# Patient Record
Sex: FEMALE | Race: WHITE | Employment: OTHER | URBAN - METROPOLITAN AREA
[De-identification: names, ages, dates, MRNs, and addresses within clinical notes are randomized per-mention and may not be internally consistent; named-entity substitution may affect disease eponyms.]

---

## 2020-01-06 ENCOUNTER — HOSPITAL ENCOUNTER (EMERGENCY)
Age: 55
Discharge: HOME | End: 2020-01-06
Attending: EMERGENCY MEDICINE
Payer: COMMERCIAL

## 2020-01-06 VITALS
RESPIRATION RATE: 15 BRPM | WEIGHT: 250 LBS | SYSTOLIC BLOOD PRESSURE: 180 MMHG | BODY MASS INDEX: 40.18 KG/M2 | TEMPERATURE: 98.3 F | HEIGHT: 66 IN | DIASTOLIC BLOOD PRESSURE: 99 MMHG | OXYGEN SATURATION: 97 % | HEART RATE: 89 BPM

## 2020-01-06 PROCEDURE — 99283 EMERGENCY DEPT VISIT LOW MDM: CPT

## 2020-01-06 ASSESSMENT — PAIN DESCRIPTION - PROGRESSION: CLINICAL_PROGRESSION: NOT CHANGED

## 2020-01-06 ASSESSMENT — PAIN DESCRIPTION - DESCRIPTORS: DESCRIPTORS: ACHING;HEADACHE

## 2020-01-06 ASSESSMENT — PAIN DESCRIPTION - ONSET: ONSET: ON-GOING

## 2020-01-06 ASSESSMENT — PAIN DESCRIPTION - PAIN TYPE: TYPE: ACUTE PAIN

## 2020-01-06 ASSESSMENT — PAIN DESCRIPTION - FREQUENCY: FREQUENCY: CONTINUOUS

## 2020-01-06 ASSESSMENT — PAIN DESCRIPTION - LOCATION: LOCATION: HEAD

## 2020-01-06 ASSESSMENT — PAIN DESCRIPTION - ORIENTATION: ORIENTATION: RIGHT

## 2020-01-06 ASSESSMENT — PAIN SCALES - GENERAL: PAINLEVEL_OUTOF10: 4

## 2020-01-06 NOTE — ED TRIAGE NOTES
Just recently started on B/P meds. Has had some spiking of her B/P. This morning twice it has been high.

## 2020-01-29 NOTE — ED PROVIDER NOTES
Triage Chief Complaint:   Hypertension and Headache    [place]:  Jorge Tipton is a 47 y.o. female that presents for hypertension. Patient states that she has been on spironolactone for about 2 weeks and has been checking her blood pressure last few days but it has been continuously high at home including up into the 180s. States that since then she started taking the medication she will have some headaches and feel drowsy for the first few hours but then feels better later in the day. She is worried because her blood pressure is still high. She does not have an appointment with her PCP tomorrow. Denies any chest pain, shortness of breath, abdominal pain, nausea, vomiting, visual changes. ROS:  At least 14 systems reviewed and otherwise acutely negative except as in the [address] Past Medical History:   Diagnosis Date    Hypertension      Past Surgical History:   Procedure Laterality Date    TONSILLECTOMY      URETHRAL STRICTURE DILATATION      WISDOM TOOTH EXTRACTION       History reviewed. No pertinent family history. Social History     Socioeconomic History    Marital status:      Spouse name: Not on file    Number of children: Not on file    Years of education: Not on file    Highest education level: Not on file   Occupational History    Not on file   Social Needs    Financial resource strain: Not on file    Food insecurity:     Worry: Not on file     Inability: Not on file    Transportation needs:     Medical: Not on file     Non-medical: Not on file   Tobacco Use    Smoking status: Never Smoker   Substance and Sexual Activity    Alcohol use:  Yes    Drug use: No    Sexual activity: Not on file   Lifestyle    Physical activity:     Days per week: Not on file     Minutes per session: Not on file    Stress: Not on file   Relationships    Social connections:     Talks on phone: Not on file     Gets together: Not on file     Attends Alevism service: Not on file     Active member of club or organization: Not on file     Attends meetings of clubs or organizations: Not on file     Relationship status: Not on file    Intimate partner violence:     Fear of current or ex partner: Not on file     Emotionally abused: Not on file     Physically abused: Not on file     Forced sexual activity: Not on file   Other Topics Concern    Not on file   Social History Narrative    Not on file     No current facility-administered medications for this encounter. Current Outpatient Medications   Medication Sig Dispense Refill    acetaminophen (AMINOFEN) 325 MG tablet Take 1 tablet by mouth every 4 hours as needed for Pain. 120 tablet 3     Allergies   Allergen Reactions    Aspirin     Penicillins     Bactrim [Sulfamethoxazole-Trimethoprim] Nausea And Vomiting       Nursing Notes Reviewed    Physical Exam:  ED Triage Vitals [01/06/20 0508]   Enc Vitals Group      BP (!) 163/104      Pulse 100      Resp 18      Temp 98.3 °F (36.8 °C)      Temp Source Oral      SpO2 97 %      Weight 250 lb (113.4 kg)      Height 5' 6\" (1.676 m)      Head Circumference       Peak Flow       Pain Score       Pain Loc       Pain Edu? Excl. in [address]? GENERAL APPEARANCE: Awake and alert. Cooperative. No acute distress. HEAD: Normocephalic. Atraumatic. EYES: EOM's grossly intact. Sclera anicteric. ENT: Mucous membranes are moist. Tolerates saliva. No trismus. NECK: Supple. Trachea midline. HEART: RRR. Radial pulses 2+. LUNGS: Respirations unlabored. CTAB  ABDOMEN: Soft. Non-tender. No guarding or rebound. EXTREMITIES: No acute deformities. SKIN: Warm and dry. NEUROLOGICAL: No gross facial drooping. Moves all 4 extremities spontaneously. PSYCHIATRIC: Normal mood. I have reviewed and interpreted all of the currently available lab results from this visit (if applicable):  No results found for this visit on 01/06/20.    Radiographs (if obtained):  [] The following radiograph was interpreted by myself in the absence of a radiologist:  [] Radiologist's Report Reviewed:    EKG (if obtained): (All EKG's are interpreted by myself in the absence of a cardiologist)    MDM:  Plan of care is discussed thoroughly with the patient and family if present. If performed, all imaging and lab work also discussed with patient. All relevant prior results and chart reviewed if available. Patient presents with asymptomatic hypertension. I believe that she does have some adverse effects from newly initiated spironolactone including drowsiness and headaches. There is nothing about her headaches concerning for acute intracranial emergent process that requires imaging at this time. The patient has no other complaints at this time consistent with an acute life-threatening emergency. Patient does not have any chest pain, shortness of breath, abdominal pain, hematuria, neurologic deficits. No indication for acute antihypertensive therapy as risks greatly outweigh benefits. I did encourage the patient to follow up closely with primary care physician in keep a daily log of blood pressures at home so that outpatient medications can be adjusted/initiated as needed by primary care physician. Patient verbalized understanding and agrees to return if any symptoms such as those listed above occurred. Clinical Impression:  1.  Hypertension, unspecified type      (Please note that portions of this note may have been completed with a voice recognition program. Efforts were made to edit the dictations but occasionally words are mis-transcribed.)    MD Monika Harper MD  01/06/20 3652 cont Simvastatin

## 2020-07-14 ENCOUNTER — TELEPHONE (OUTPATIENT)
Dept: BARIATRICS/WEIGHT MGMT | Age: 55
End: 2020-07-14

## 2020-08-12 ENCOUNTER — OFFICE VISIT (OUTPATIENT)
Dept: BARIATRICS/WEIGHT MGMT | Age: 55
End: 2020-08-12
Payer: COMMERCIAL

## 2020-08-12 VITALS
DIASTOLIC BLOOD PRESSURE: 86 MMHG | SYSTOLIC BLOOD PRESSURE: 122 MMHG | TEMPERATURE: 98.8 F | HEIGHT: 66 IN | WEIGHT: 271 LBS | HEART RATE: 107 BPM | OXYGEN SATURATION: 95 % | BODY MASS INDEX: 43.55 KG/M2

## 2020-08-12 PROBLEM — E66.01 MORBID OBESITY WITH BMI OF 40.0-44.9, ADULT (HCC): Status: ACTIVE | Noted: 2020-08-12

## 2020-08-12 PROCEDURE — 99204 OFFICE O/P NEW MOD 45 MIN: CPT | Performed by: SURGERY

## 2020-08-12 RX ORDER — AMLODIPINE BESYLATE 2.5 MG/1
TABLET ORAL
COMMUNITY
Start: 2020-08-03

## 2020-08-12 RX ORDER — MONTELUKAST SODIUM 10 MG/1
TABLET ORAL
COMMUNITY
Start: 2020-08-03

## 2020-08-12 SDOH — HEALTH STABILITY: MENTAL HEALTH: HOW MANY STANDARD DRINKS CONTAINING ALCOHOL DO YOU HAVE ON A TYPICAL DAY?: 1 OR 2

## 2020-08-12 ASSESSMENT — ENCOUNTER SYMPTOMS
ABDOMINAL DISTENTION: 1
SHORTNESS OF BREATH: 1
NAUSEA: 0
BLOOD IN STOOL: 0
VOICE CHANGE: 0
CONSTIPATION: 0
PHOTOPHOBIA: 0
COLOR CHANGE: 0
ABDOMINAL PAIN: 1
ANAL BLEEDING: 0
VOMITING: 0
TROUBLE SWALLOWING: 0
DIARRHEA: 0
COUGH: 0
WHEEZING: 0
BACK PAIN: 1
SORE THROAT: 0

## 2020-08-12 NOTE — PROGRESS NOTES
Bariatric Surgery Consultation    Kavya Sunshine, 1965, 47 y.o.,  female, CSN:   08/12/20     Chief Complaint:    Chief Complaint   Patient presents with    Bariatric, Initial Visit     New Bariatric Surgical Consult       SUBJECTIVE:  Dolly Dash is a 47 y.o. female being seen for morbid obesity, considering weight loss surgery; Afsaneh's, Height: 5' 6\" (167.6 cm), Weight: 271 lb (122.9 kg), Current Body mass index is 43.74 kg/m². The patient's PCP is the referring physician Aditya Frankel MD    HPI:  Dolly Dash has suffered from overweight / severe obesity for (10) years, and was of gradual onset but progressive course - tried MANY different diets and on and off over the weeks, months and years depression some difficult time with mom's dementia. . 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 and has 3 children. The patient first recognized that she had a weight problem about 10 years ago. The patient's lowest weight in the last five years was 240 lbs, and the highest weight in the last five years was 271.0 lbs. Weight Loss Program History:  The patient states she has tried Self dieting, Adipex, unsure of other medication. The most weight lost ever with diet and exercise was 10 Lbs, but unfortunately only kept them of for 3months. These attempts have been temporarily successful with weight loss, but have failed to sustain adequate weight loss. Mortality from the morbid obesity is very high:       Arianes life is significantly affected by weight related to her co-morbidities. The patient has also tried but failed self directed diet and exercise. Comorbid Conditions:  Significant diseases affecting this patient are   Past Medical History:   Diagnosis Date    Hypertension      And     Review of Systems - Review of Systems   Constitutional: Positive for fatigue. Negative for activity change, chills, diaphoresis and fever.    HENT: Negative for sore throat, trouble swallowing and voice change. Eyes: Negative for photophobia and visual disturbance. Respiratory: Positive for shortness of breath. Negative for cough and wheezing. Cardiovascular: Positive for leg swelling (More so my left than right. Marian Simpler ). Negative for chest pain and palpitations. Gastrointestinal: Positive for abdominal distention and abdominal pain (HEART BURN a lot, on prevacid and pepcid. Marian Simpler ). Negative for anal bleeding, blood in stool, constipation, diarrhea, nausea and vomiting. Endocrine: Positive for polyphagia. Negative for cold intolerance, heat intolerance, polydipsia and polyuria. Genitourinary: Positive for urgency. Negative for dysuria, frequency and hematuria. Musculoskeletal: Positive for arthralgias, back pain (LOWER back aches. Marian Simpler ) and gait problem. Negative for joint swelling, myalgias and neck stiffness. Skin: Negative for color change and rash. Neurological: Negative for seizures, speech difficulty, light-headedness and numbness. Hematological: Negative for adenopathy. Does not bruise/bleed easily. Psychiatric/Behavioral: Negative for sleep disturbance. The patient is nervous/anxious. All others reviewed and are negative. Allergies: Allergies   Allergen Reactions    Aspirin     Penicillins     Bactrim [Sulfamethoxazole-Trimethoprim] Nausea And Vomiting       Medications:  Current Outpatient Medications   Medication Sig Dispense Refill    amLODIPine (NORVASC) 2.5 MG tablet TAKE 1 TABLET BY MOUTH ONE TIME A DAY      montelukast (SINGULAIR) 10 MG tablet TAKE 1 TABLET BY MOUTH AT BEDTIME      acetaminophen (AMINOFEN) 325 MG tablet Take 1 tablet by mouth every 4 hours as needed for Pain. 120 tablet 3     No current facility-administered medications for this visit.         Past Surgical History:  Past Surgical History:   Procedure Laterality Date    ENDOMETRIAL ABLATION N/A 07/2020    TONSILLECTOMY      URETHRAL STRICTURE DILATATION  WISDOM TOOTH EXTRACTION         Family History:  Family History   Problem Relation Age of Onset   Tesfaye Acosta Cancer Mother     Dementia Mother     Hypertension Father     Diabetes Half-Brother        Social History:  Social History     Socioeconomic History    Marital status:      Spouse name: Not on file    Number of children: Not on file    Years of education: Not on file    Highest education level: Not on file   Occupational History    Not on file   Social Needs    Financial resource strain: Not on file    Food insecurity     Worry: Not on file     Inability: Not on file    Transportation needs     Medical: Not on file     Non-medical: Not on file   Tobacco Use    Smoking status: Never Smoker    Smokeless tobacco: Never Used   Substance and Sexual Activity    Alcohol use:  Yes     Alcohol/week: 7.0 - 14.0 standard drinks     Types: 7 - 14 Cans of beer per week     Drinks per session: 1 or 2    Drug use: No    Sexual activity: Not on file   Lifestyle    Physical activity     Days per week: Not on file     Minutes per session: Not on file    Stress: Not on file   Relationships    Social connections     Talks on phone: Not on file     Gets together: Not on file     Attends Mormon service: Not on file     Active member of club or organization: Not on file     Attends meetings of clubs or organizations: Not on file     Relationship status: Not on file    Intimate partner violence     Fear of current or ex partner: Not on file     Emotionally abused: Not on file     Physically abused: Not on file     Forced sexual activity: Not on file   Other Topics Concern    Not on file   Social History Narrative    Not on file         OBJECTIVE:     Physical Exam   /86 (Site: Left Upper Arm, Position: Sitting, Cuff Size: Large Adult)   Pulse 107   Temp 98.8 °F (37.1 °C) (Infrared)   Ht 5' 6\" (1.676 m)   Wt 271 lb (122.9 kg)   SpO2 95%   BMI 43.74 kg/m²    Constitutional:  Vital signs are normal. The patient appears well-developed and well-nourished. Head: Normocephalic. Neck: No mass and no thyromegaly present. Cardiovascular: Normal rate, regular rhythm, S1 normal and S2 normal.  No murmurs. Edema   Pulmonary/Chest: Effort normal and breath sounds normal.   Abdominal: Soft. Normal appearance. There is no splenomegaly, but fatty liver. No tenderness. There is no rigidity, no rebound, no guarding and no Craig's sign. Musculoskeletal:        Right lower leg: Normal. No tenderness and no edema. Left lower leg: Normal. No tenderness and no edema. Lymphadenopathy:     No cervical adenopathy. No axillary adenopathy. Skin: Skin is warm, dry and intact. No rash. Psychiatric: The patient is alert and oriented. The patient has a normal mood and affect. Speech is normal and behavior is normal. Judgment and thought content normal. Cognition and memory are normal.     ASSESSMENT & PLAN:    Patient Active Problem List   Diagnosis    Cellulitis    Morbid obesity with BMI of 40.0-44.9, adult ([facility] [address]       Counseled in length and will proceed. The patient is good candidate for surgery. The patient is interested in the RoboticSleeve Gastrectomy. Will continue work up toward surgery. Counseled extensively regardin) DIET and MONITOR the Weight:  - 1500 Kcal Diet/day. - Write down everything you eat and drink for 1 wk  - No calories from drinks  - Slim fast diet: 4 cans per day 1-2 days a week with only NO caloriesdrinks those days    2) EXERCISE: 1 hr/day 5 days a week    3) DIETITIAN / NUTRITIONAL CONSULT, evaluation and counseling to [place] healthy diet ~1500 Kcal /day    4) EXERCISE PHYSIOLOGIST CONSULT    5)PSYCHOLOGICAL EVALUATION    6) MONTHLY FOLLOW UP,  to follow and document diet and exercise trial    7) Planning a Sleeve Gastrectomy in few months    8) 2 Pictures were taken today to concretely monitorweight loss over time.     9) CARDIOLOGY OPTIMIZATION AND CLEARANCE BEFORE SURGERY    10) PULMONOLOGY OPTIMIZATION AND CLEARANCE BEFORE SURGERY    WILL CHECK BASELINE BARIATRIC COMPREHENSIVE LABS. Orders Placed This Encounter   Procedures    Basic Metabolic Panel    CBC Auto Differential    Hemoglobin A1C    Hepatic Function Panel    Lipid Panel    TSH without Reflex    Amb Referral to Nutrition Services    Ambulatory referral to Physical Therapy        Pt was handed a food diary notebook to help monitor Sancho intake, and patientinformation pamphlet on Sleeve Gastrectomy. I counseled the patient regarding weight loss, appropriate diet and exercise, and healthy eating habits.    - Eat slow, and chew 50-60 times before swallowing  - Eat smaller portions in smaller plates  - Weigh yourself at least 2-3 times a week    The patient will be scheduled for a follow up visit in Return in about 4 weeks (around 9/9/2020) for Bariatric follow up: diet, exercise & weight loss, For imaging and tests results review. .    Bariatric 1200 calorie diet, AFSHAN, heart healthy, 60-80 gram protein.    ____________________________________________    Lisa Garsia MD, FACS, FICS  Member of the American Society of Metabolic and Bariatric Surgeons    8/12/2020  3:57 PM

## 2020-08-17 ENCOUNTER — HOSPITAL ENCOUNTER (OUTPATIENT)
Age: 55
Discharge: HOME | End: 2020-08-17
Payer: COMMERCIAL

## 2020-08-17 LAB
ALBUMIN SERPL-MCNC: 4.7 GM/DL (ref 3.4–5)
ALP BLD-CCNC: 103 IU/L (ref 40–129)
ALT SERPL-CCNC: 29 U/L (ref 10–40)
ANION GAP SERPL CALCULATED.3IONS-SCNC: 10 MMOL/L (ref 4–16)
AST SERPL-CCNC: 29 IU/L (ref 15–37)
BASOPHILS ABSOLUTE: 0 K/CU MM
BASOPHILS RELATIVE PERCENT: 0.5 % (ref 0–1)
BILIRUB SERPL-MCNC: 0.7 MG/DL (ref 0–1)
BILIRUBIN DIRECT: 0.2 MG/DL (ref 0–0.3)
BILIRUBIN, INDIRECT: 0.5 MG/DL (ref 0–0.7)
BUN BLDV-MCNC: 12 MG/DL (ref 6–23)
CALCIUM SERPL-MCNC: 10 MG/DL (ref 8.3–10.6)
CHLORIDE BLD-SCNC: 101 MMOL/L (ref 99–110)
CHOLESTEROL: 229 MG/DL
CO2: 27 MMOL/L (ref 21–32)
CREAT SERPL-MCNC: 0.7 MG/DL (ref 0.6–1.1)
DIFFERENTIAL TYPE: ABNORMAL
EOSINOPHILS ABSOLUTE: 0.1 K/CU MM
EOSINOPHILS RELATIVE PERCENT: 1.1 % (ref 0–3)
ESTIMATED AVERAGE GLUCOSE: 117 MG/DL
GFR AFRICAN AMERICAN: >60 ML/MIN/1.73M2
GFR NON-AFRICAN AMERICAN: >60 ML/MIN/1.73M2
GLUCOSE BLD-MCNC: 87 MG/DL (ref 70–99)
HBA1C MFR BLD: 5.7 % (ref 4.2–6.3)
HCT VFR BLD CALC: 41.8 % (ref 37–47)
HDLC SERPL-MCNC: 68 MG/DL
HEMOGLOBIN: 13.7 GM/DL (ref 12.5–16)
IMMATURE NEUTROPHIL %: 0.3 % (ref 0–0.43)
LDL CHOLESTEROL DIRECT: 160 MG/DL
LYMPHOCYTES ABSOLUTE: 2 K/CU MM
LYMPHOCYTES RELATIVE PERCENT: 31.1 % (ref 24–44)
MCH RBC QN AUTO: 34.6 PG (ref 27–31)
MCHC RBC AUTO-ENTMCNC: 32.8 % (ref 32–36)
MCV RBC AUTO: 105.6 FL (ref 78–100)
MONOCYTES ABSOLUTE: 0.6 K/CU MM
MONOCYTES RELATIVE PERCENT: 9.1 % (ref 0–4)
NUCLEATED RBC %: 0 %
PDW BLD-RTO: 12.7 % (ref 11.7–14.9)
PLATELET # BLD: 299 K/CU MM (ref 140–440)
PMV BLD AUTO: 9.6 FL (ref 7.5–11.1)
POTASSIUM SERPL-SCNC: 4.3 MMOL/L (ref 3.5–5.1)
RBC # BLD: 3.96 M/CU MM (ref 4.2–5.4)
SEGMENTED NEUTROPHILS ABSOLUTE COUNT: 3.7 K/CU MM
SEGMENTED NEUTROPHILS RELATIVE PERCENT: 57.9 % (ref 36–66)
SODIUM BLD-SCNC: 138 MMOL/L (ref 135–145)
TOTAL IMMATURE NEUTOROPHIL: 0.02 K/CU MM
TOTAL NUCLEATED RBC: 0 K/CU MM
TOTAL PROTEIN: 7.4 GM/DL (ref 6.4–8.2)
TRIGL SERPL-MCNC: 115 MG/DL
TSH HIGH SENSITIVITY: 3.85 UIU/ML (ref 0.27–4.2)
WBC # BLD: 6.4 K/CU MM (ref 4–10.5)

## 2020-08-17 PROCEDURE — 83721 ASSAY OF BLOOD LIPOPROTEIN: CPT

## 2020-08-17 PROCEDURE — 80061 LIPID PANEL: CPT

## 2020-08-17 PROCEDURE — 85025 COMPLETE CBC W/AUTO DIFF WBC: CPT

## 2020-08-17 PROCEDURE — 80053 COMPREHEN METABOLIC PANEL: CPT

## 2020-08-17 PROCEDURE — 84443 ASSAY THYROID STIM HORMONE: CPT

## 2020-08-17 PROCEDURE — 36415 COLL VENOUS BLD VENIPUNCTURE: CPT

## 2020-08-17 PROCEDURE — 82248 BILIRUBIN DIRECT: CPT

## 2020-08-17 PROCEDURE — 83036 HEMOGLOBIN GLYCOSYLATED A1C: CPT

## 2020-08-26 ENCOUNTER — HOSPITAL ENCOUNTER (OUTPATIENT)
Dept: DIABETES SERVICES | Age: 55
Setting detail: THERAPIES SERIES
Discharge: HOME | End: 2020-08-26
Payer: COMMERCIAL

## 2020-08-26 ENCOUNTER — TELEPHONE (OUTPATIENT)
Dept: BARIATRICS/WEIGHT MGMT | Age: 55
End: 2020-08-26

## 2020-08-26 NOTE — PROGRESS NOTES
Nutrition Note    Patient was referred to dietitian at the weight loss/bariatric program but unfortunately scheduled at the wrong facility. Contacted the bariatric office regarding the referral. Patient now aware and will be rescheduled.      Electronically signed by Shakeel Corona RD, COSMO on 8/26/20 at 3:51 PM EDT    Contact: [phone]

## 2020-08-26 NOTE — TELEPHONE ENCOUNTER
Called pt to let her know her Nutrition appt was scheduled wrong. Needs to see Andreia Field.  Please schedule per usual with Andreia Field for Initial Nutrition consult

## 2020-09-01 ENCOUNTER — TELEMEDICINE (OUTPATIENT)
Dept: BARIATRICS/WEIGHT MGMT | Age: 55
End: 2020-09-01

## 2020-09-01 PROCEDURE — 99999 PR OFFICE/OUTPT VISIT,PROCEDURE ONLY: CPT

## 2020-09-01 NOTE — PROGRESS NOTES
Outpatient Nutrition Counseling    REASON FOR VISIT: Initial Nutrition    Chief Complaint:    Chief Complaint   Patient presents with    Weight Management       SUBJECTIVE:  Pt was seen via video for Initial Nutrition instruction. Instructed on mindful eating, label reading, calorie counting, healthy food choice and goal setting. Pt verbalized understanding to all info provided. All handouts including handbook emailed to patient. The patient is a 47 y.o. female being seen for morbid obesity, considering weight loss surgery; Afsaneh's,  ,  , Current There is no height or weight on file to calculate BMI. The patient's PCP is Andres Peralta MD     Comorbid Conditions:  Significant diseases affecting this patient are   Past Medical History:   Diagnosis Date    Hypertension    . Review of Systems - Review of Systems  Otherwise per HPI. Allergies:   Allergies   Allergen Reactions    Aspirin     Penicillins     Bactrim [Sulfamethoxazole-Trimethoprim] Nausea And Vomiting       Past Surgical History:  Past Surgical History:   Procedure Laterality Date    ENDOMETRIAL ABLATION N/A 07/2020    TONSILLECTOMY      URETHRAL STRICTURE DILATATION      WISDOM TOOTH EXTRACTION         Family History:  Family History   Problem Relation Age of Onset    Cancer Mother     Dementia Mother     Hypertension Father     Diabetes Half-Brother        Social History:  Social History     Socioeconomic History    Marital status:      Spouse name: Not on file    Number of children: Not on file    Years of education: Not on file    Highest education level: Not on file   Occupational History    Not on file   Social Needs    Financial resource strain: Not on file    Food insecurity     Worry: Not on file     Inability: Not on file    Transportation needs     Medical: Not on file     Non-medical: Not on file   Tobacco Use    Smoking status: Never Smoker    Smokeless tobacco: Never Used   Substance and Sexual Activity  Alcohol use: Yes     Alcohol/week: 7.0 - 14.0 standard drinks     Types: 7 - 14 Cans of beer per week     Drinks per session: 1 or 2    Drug use: No    Sexual activity: Not on file   Lifestyle    Physical activity     Days per week: Not on file     Minutes per session: Not on file    Stress: Not on file   Relationships    Social connections     Talks on phone: Not on file     Gets together: Not on file     Attends Rastafarian service: Not on file     Active member of club or organization: Not on file     Attends meetings of clubs or organizations: Not on file     Relationship status: Not on file    Intimate partner violence     Fear of current or ex partner: Not on file     Emotionally abused: Not on file     Physically abused: Not on file     Forced sexual activity: Not on file   Other Topics Concern    Not on file   Social History Narrative    Not on file         OBJECTIVE:  Physical Exam   There were no vitals taken for this visit.        NUTRITION DIAGNOSIS: Overweight / Obesity   Problem: Increased adiposity compared to reference standard or established norms   Etiology: Excess intake compared to output over time   S/S: Ht: 66\" Wt: 271 lbs BMI: 43.74    NUTRITION INTERVENTIONS:    Individualized treatment goals to address nutritiondiagnosis:   Instructed on 1200 kcal diet for weight loss   Provided pt handbook, food lists, and goal card   Encouraged Physical activity as approved by physician    MONITORING/ EVALUATION/ PLAN:   Pt verbalized understanding of allmaterials covered   Pt asked pertinent questions throughout the session - expect compliance with nutrition guidelines presented   Provided pt with contact information should questions arise prior to next visit   Will f/u with pt in 2-3 months for further education   Chris Mcknight MS, RDN, LD  9/1/2020

## 2020-09-14 ENCOUNTER — HOSPITAL ENCOUNTER (OUTPATIENT)
Dept: PHYSICAL THERAPY | Age: 55
Setting detail: THERAPIES SERIES
Discharge: HOME | End: 2020-09-14
Payer: COMMERCIAL

## 2020-09-14 PROCEDURE — 97110 THERAPEUTIC EXERCISES: CPT

## 2020-09-14 PROCEDURE — 97161 PT EVAL LOW COMPLEX 20 MIN: CPT

## 2020-09-14 NOTE — PROGRESS NOTES
Physical Therapy  Initial Assessment  Date: 2020  Patient Name: Kenadll Downing  MRN: [id number]  : 1965     Treatment Diagnosis: bariatric    Restrictions       Subjective   General  Chart Reviewed: Yes  Patient assessed for rehabilitation services?: Yes  Additional Pertinent Hx: has some occasional R hip pain otherwise not major orthopedic issues. is recovering from bronchitis currently too, bad allergies. Referring Practitioner: Minor Nora  Diagnosis: bariatric  PT Visit Information  PT Insurance Information: UMR 50 PCY  Subjective  Subjective: only exercise currently is walking but not consistently. In past has done some TM, long time since any weight training. Some familiarity w/ target heart rate and importance. Support at home is fair to good. Pt would be interested in talking to psychologist to help her deal w/ some issues ongoing. Pain Screening  Patient Currently in Pain: No  Vital Signs  Patient Currently in Pain: No    Vision/Hearing       Orientation  Orientation  Overall Orientation Status: Within Normal Limits    Social/Functional History  Social/Functional History  Lives With: Spouse  Occupation: Full time employment  Type of occupation: retiring in [address] [employer]: [employer]    Objective     Observation/Palpation  Observation: overweight female, lots of coughing today.     AROM RLE (degrees)  RLE AROM: WNL  AROM LLE (degrees)  LLE AROM : WNL  AROM RUE (degrees)  RUE AROM : WNL  AROM LUE (degrees)  LUE AROM : WNL    Strength RLE  Strength RLE: WNL  Strength LLE  Strength LLE: WNL  Strength RUE  Strength RUE: WNL  Strength LUE  Strength LUE: WNL  Strength Other  Other: good bridge     Additional Measures  Flexibility: mild quad and hamstring tightness noted  Other: 6 MWT 1259 feet        Assessment   Conditions Requiring Skilled Therapeutic Intervention  Body structures, Functions, Activity limitations: (obesity)  Assessment: Pt is a 55 yo female who presents to PT for clearance to begin bariatric weigth loss program.  She has no real orthopedic barriers but does have significant allergies currently impacting breathing and exercise capacity. She has fair to good support at home and good knowledge base for exercise. She may need some emotional support and is interested in talking w/ psychologist.  She is a good candidate for this bariatric program.  Patient agrees with established plan of care and assisted in the development of their short term and long term goals. Patient had no adverse reaction with initial treatment and there are no barriers to learning. Demonstrates no mental or cognitive disorder.     Treatment Diagnosis: bariatric  Prognosis: Good  Decision Making: Low Complexity  Barriers to Learning: none  REQUIRES PT FOLLOW UP: Yes  Treatment Initiated : education for next steps and exercise         Plan   Plan  Times per week: 1x/week  Plan weeks: 2 weeks  Specific instructions for Next Treatment: group instruction for bariatric program  Current Treatment Recommendations: Home Exercise Program       OutComes Score     See 6 MWT above     Goals  Short term goals  Time Frame for Short term goals: 2 sessions  Short term goal 1: Utilizing group and individual instruction, patient will be educated in physical activity/ exercise concepts including use of basic fitness equipment and developing a personal exercise program  Patient Goals   Patient goals : healthier lifestyle                  Lise Barroso, PT  PT, MPT, ATC     9/14/2020, 5:46 PM      .

## 2020-09-14 NOTE — PLAN OF CARE
Outpatient Physical Therapy           [place]           [x] Phone: [phone]   Fax: [phone]  [facility]           [] Phone: [phone]   Fax: [phone]     To: Referring Practitioner: Holly Desir    From: Heather Shepherd PT     Patient: Manuela Vega       : 1965  Diagnosis: Diagnosis: bariatric   Treatment Diagnosis: Treatment Diagnosis: bariatric   Date: 2020    Physical Therapy Certification/Re-Certification Form  Dear Dr. Holly Desir,   The following patient has been evaluated for physical therapy services and for therapy to continue, insurance requires physician review of the treatment plan initially and every 90 days. Please review the attached evaluation and/or summary of the patient's plan of care, and verify that you agree therapy should continue by signing the attached document and sending it back to our office. Assessment:    Assessment: Pt is a 55 yo female who presents to PT for clearance to begin bariatric weigth loss program.  She has no real orthopedic barriers but does have significant allergies currently impacting breathing and exercise capacity. She has fair to good support at home and good knowledge base for exercise. She may need some emotional support and is interested in talking w/ psychologist.  She is a good candidate for this bariatric program.  Patient agrees with established plan of care and assisted in the development of their short term and long term goals. Patient had no adverse reaction with initial treatment and there are no barriers to learning. Demonstrates no mental or cognitive disorder.       Plan of Care/Treatment to date:  [x] Therapeutic Exercise  [] Modalities:  [] Therapeutic Activity     [] Ultrasound  [] Electrical Stimulation  [] Gait Training      [] Cervical Traction [] Lumbar Traction  [] Neuromuscular Re-education    [] Cold/hotpack [] Iontophoresis   [] Instruction in HEP      [] Vasopneumatic     [] Manual Therapy               [] Aquatic Therapy       Other:  clearance for bariatric program, group and individual exercise instruction         Frequency/Duration:  Pt will be discharged following group session. # Days per week: [x] 1 day # Weeks: [] 1 week [] 5 weeks     [] 2 days   [x] 2 weeks [] 6 weeks     [] 3 days   [] 3 weeks [] 7 weeks     [] 4 days   [] 4 weeks [] 8 weeks         [] 9 weeks [] 10 weeks         [] 11 weeks [] 12 weeks    Rehab Potential/Progress: [] Excellent [x] Good [] Fair  [] Poor     Goals:      Short term goals  Time Frame for Short term goals: 2 sessions  Short term goal 1: Utilizing group and individual instruction, patient will be educated in physical activity/ exercise concepts including use of basic fitness equipment and developing a personal exercise program         Electronically signed by:  Renetta Smith, PT, MPT, ATC  9/14/2020, 5:51 PM    9/14/2020, 5:52 PM  If you have any questions or concerns, please don't hesitate to call.   Thank you for your referral.      Physician Signature:________________________________Date:_________ TIME: _____  By signing above, therapists plan is approved by physician

## 2020-09-21 ENCOUNTER — TELEMEDICINE (OUTPATIENT)
Dept: BARIATRICS/WEIGHT MGMT | Age: 55
End: 2020-09-21
Payer: COMMERCIAL

## 2020-09-21 VITALS — BODY MASS INDEX: 41.97 KG/M2 | WEIGHT: 260 LBS

## 2020-09-21 PROBLEM — I10 ESSENTIAL HYPERTENSION: Status: ACTIVE | Noted: 2020-09-21

## 2020-09-21 PROCEDURE — 99214 OFFICE O/P EST MOD 30 MIN: CPT | Performed by: NURSE PRACTITIONER

## 2020-09-21 ASSESSMENT — ENCOUNTER SYMPTOMS
TROUBLE SWALLOWING: 0
ABDOMINAL PAIN: 0
NAUSEA: 0
ABDOMINAL DISTENTION: 0
EYE PAIN: 0
CHEST TIGHTNESS: 0
WHEEZING: 0
DIARRHEA: 0
SHORTNESS OF BREATH: 0
RHINORRHEA: 0

## 2020-09-21 NOTE — PROGRESS NOTES
2020    TELEHEALTH EVALUATION -- Audio/Visual (During WFBIS-99 public health emergency)    HPI:    Kathy Haney (:  1965) has requested an audio/video evaluation for the following concern(s):  Presents today for follow up 2nd  visit. She had visit with RD. Had PT visit and follow up. HTN, stable on norvasc. Allergies, recently on flonase. No hx of blood clots. Weight 260 lbs, down 10 lbs since last visit. Diet recall, working on breakfast. Chicken and turkey mostly for lean meats. Fruit moderation. Salad for vegetables. Water intake is good. No calories in drinks. Weight 260 lbs. Review of Systems   Constitutional: Negative. Negative for appetite change, fatigue and fever. HENT: Negative for congestion, dental problem, hearing loss, rhinorrhea and trouble swallowing. Eyes: Negative for pain. Respiratory: Negative for chest tightness, shortness of breath and wheezing. Cardiovascular: Negative for chest pain, palpitations and leg swelling. Gastrointestinal: Negative for abdominal distention, abdominal pain, diarrhea and nausea. Endocrine: Negative for cold intolerance and polydipsia. Genitourinary: Negative for difficulty urinating and frequency. Musculoskeletal: Negative for arthralgias and gait problem. Skin: Negative for rash. Allergic/Immunologic: Negative for environmental allergies. Neurological: Negative for dizziness, seizures and syncope. Hematological: Does not bruise/bleed easily. Psychiatric/Behavioral: Negative for behavioral problems and suicidal ideas. Prior to Visit Medications    Medication Sig Taking? Authorizing Provider   amLODIPine (NORVASC) 2.5 MG tablet TAKE 1 TABLET BY MOUTH ONE TIME A DAY  Historical Provider, MD   montelukast (SINGULAIR) 10 MG tablet TAKE 1 TABLET BY MOUTH AT BEDTIME  Historical Provider, MD   acetaminophen (AMINOFEN) 325 MG tablet Take 1 tablet by mouth every 4 hours as needed for Pain.   Fallon Morfin MD [facility])  - Patient is down -10 lbs thus far and doing well. - Diet recall looks good, making changes. - Will need cardiac and pulmonary clearances. - External Referral To Cardiology  - Ambulatory referral to Pulmonology  - 1st visit with NP, risk stratification.   - Discussed program and all expectations etc.   - RTC 1 month with NP>     2. Essential hypertension  - HTN stable, norvasc currently. - PCP managing.   - External Referral To Cardiology    3. Pre-op evaluation  - will need clearances. - External Referral To Cardiology  - Ambulatory referral to Pulmonology      No follow-ups on file. Yessica Montague is a 54 y.o. female being evaluated by a Virtual Visit (video visit) encounter to address concerns as mentioned above. A caregiver was present when appropriate. Due to this being a TeleHealth encounter (During UYNCQ-93 public health emergency), evaluation of the following organ systems was limited: Vitals/Constitutional/EENT/Resp/CV/GI//MS/Neuro/Skin/Heme-Lymph-Imm. Pursuant to the emergency declaration under the [address] [place] [address], [address] [place] [address] [facility] authority and the [employer] Act, this Virtual Visit was conducted with patient's (and/or legal guardian's) consent, to reduce the patient's risk of exposure to COVID-19 and provide necessary medical care. The patient (and/or legal guardian) has also been advised to contact this office for worsening conditions or problems, and seek emergency medical treatment and/or call 911 if deemed necessary. Patient identification was verified at the start of the visit: Yes    Total time spent on this encounter: 22    Services were provided through a video synchronous discussion virtually to substitute for in-person clinic visit. Patient and provider were located at their individual homes.     --JOSE ALBERTO Rhodes - CNP on 9/21/2020 at 5:54 PM

## 2020-09-23 ENCOUNTER — HOSPITAL ENCOUNTER (OUTPATIENT)
Dept: PHYSICAL THERAPY | Age: 55
Setting detail: THERAPIES SERIES
Discharge: HOME | End: 2020-09-23
Payer: COMMERCIAL

## 2020-09-23 PROCEDURE — 97150 GROUP THERAPEUTIC PROCEDURES: CPT

## 2020-10-19 ENCOUNTER — TELEMEDICINE (OUTPATIENT)
Dept: BARIATRICS/WEIGHT MGMT | Age: 55
End: 2020-10-19
Payer: COMMERCIAL

## 2020-10-19 VITALS — WEIGHT: 263 LBS | BODY MASS INDEX: 42.45 KG/M2

## 2020-10-19 PROCEDURE — 99213 OFFICE O/P EST LOW 20 MIN: CPT | Performed by: NURSE PRACTITIONER

## 2020-10-19 ASSESSMENT — ENCOUNTER SYMPTOMS
CHEST TIGHTNESS: 0
DIARRHEA: 0
WHEEZING: 0
ABDOMINAL DISTENTION: 0
NAUSEA: 0
TROUBLE SWALLOWING: 0
ABDOMINAL PAIN: 0
EYE PAIN: 0
SHORTNESS OF BREATH: 0
RHINORRHEA: 0

## 2020-10-19 NOTE — PROGRESS NOTES
session: 1 or 2    Drug use: No        Allergies   Allergen Reactions    Aspirin     Penicillins     Bactrim [Sulfamethoxazole-Trimethoprim] Nausea And Vomiting   ,   Past Medical History:   Diagnosis Date    Hypertension    ,   Past Surgical History:   Procedure Laterality Date    ENDOMETRIAL ABLATION N/A 07/2020    TONSILLECTOMY      URETHRAL STRICTURE DILATATION      WISDOM TOOTH EXTRACTION     ,   Social History     Tobacco Use    Smoking status: Never Smoker    Smokeless tobacco: Never Used   Substance Use Topics    Alcohol use: Yes     Alcohol/week: 7.0 - 14.0 standard drinks     Types: 7 - 14 Cans of beer per week     Drinks per session: 1 or 2    Drug use: No       PHYSICAL EXAMINATION:    Constitutional:       Appearance: She is well-developed. Comments: Obese   HENT:      Head: Normocephalic and atraumatic. Right Ear: Hearing and ear canal normal.      Left Ear: Hearing and ear canal normal.      Nose: Nose normal.      Eyes:      Conjunctiva/sclera: Conjunctivae normal.   Neck:      Musculoskeletal: Normal range of motion. Cardiovascular:   N/A  Pulmonary:      Effort: Pulmonary effort is normal.   Musculoskeletal: Normal range of motion. Comments: In all 4 extremities. Skin:     General: Skin is warm and dry. Findings: No rash. Neurological:      Mental Status: She is alert and oriented to person, place, and time. GCS: GCS eye subscore is 4. GCS verbal subscore is 5. GCS motor subscore is 6. Motor: No abnormal muscle tone. Psychiatric:         Behavior: Behavior normal.         Judgment: Judgment normal.     Limited due to virtual visit. ASSESSMENT/PLAN:  1. Essential hypertension  - HTN stable, continue working on diet and weight loss. - norvasc daily.   - PCP managing. 2. Morbid obesity with BMI of 40.0-44.9, adult ([facility] [address]  - Patient up slightly in weight.   - Focusing on family and loss of mother.   - Will hold off on psych.    - Working on pulmonary and cardiac.   - RTC 1 month with surgeon, egd/consent. Return in about 1 month (around 11/19/2020) for Weight Check. Jazmin Monzon is a 54 y.o. female being evaluated by a Virtual Visit (video visit) encounter to address concerns as mentioned above. A caregiver was present when appropriate. Due to this being a TeleHealth encounter (During [facility]- public health emergency), evaluation of the following organ systems was limited: Vitals/Constitutional/EENT/Resp/CV/GI//MS/Neuro/Skin/Heme-Lymph-Imm. Pursuant to the emergency declaration under the [address] [place] [address], [address] [place] [address] authority and the [employer] and [employer] Act, this Virtual Visit was conducted with patient's (and/or legal guardian's) consent, to reduce the patient's risk of exposure to COVID-19 and provide necessary medical care. The patient (and/or legal guardian) has also been advised to contact this office for worsening conditions or problems, and seek emergency medical treatment and/or call 911 if deemed necessary. Patient identification was verified at the start of the visit: Yes    Total time spent on this encounter: 15    Services were provided through a video synchronous discussion virtually to substitute for in-person clinic visit. Patient and provider were located at their individual homes.     --JOSE ALBERTO Mckay - CNP on 10/19/2020 at 5:51 PM

## 2020-10-30 ENCOUNTER — HOSPITAL ENCOUNTER (OUTPATIENT)
Dept: LAB | Age: 55
Discharge: HOME | End: 2020-10-30
Payer: COMMERCIAL

## 2020-10-30 PROCEDURE — C9803 HOPD COVID-19 SPEC COLLECT: HCPCS

## 2020-10-30 PROCEDURE — U0002 COVID-19 LAB TEST NON-CDC: HCPCS

## 2020-10-31 LAB
SARS-COV-2: NOT DETECTED
SOURCE: NORMAL

## 2020-11-03 ENCOUNTER — HOSPITAL ENCOUNTER (OUTPATIENT)
Dept: PULMONOLOGY | Age: 55
Discharge: HOME | End: 2020-11-03
Payer: COMMERCIAL

## 2020-11-03 LAB
DLCO %PRED: 83 %
DLCO PRED: NORMAL
DLCO/VA %PRED: NORMAL
DLCO/VA PRED: NORMAL
DLCO/VA: NORMAL
DLCO: NORMAL
EXPIRATORY TIME-POST: NORMAL
EXPIRATORY TIME: NORMAL
FEF 25-75% %CHNG: NORMAL
FEF 25-75% %PRED-POST: NORMAL
FEF 25-75% %PRED-PRE: NORMAL
FEF 25-75% PRED: NORMAL
FEF 25-75%-POST: NORMAL
FEF 25-75%-PRE: NORMAL
FEV1 %PRED-POST: 79 %
FEV1 %PRED-PRE: 77 %
FEV1 PRED: NORMAL
FEV1-POST: NORMAL
FEV1-PRE: NORMAL
FEV1/FVC %PRED-POST: NORMAL
FEV1/FVC %PRED-PRE: NORMAL
FEV1/FVC PRED: NORMAL
FEV1/FVC-POST: 96 %
FEV1/FVC-PRE: 92 %
FVC %PRED-POST: NORMAL
FVC %PRED-PRE: NORMAL
FVC PRED: NORMAL
FVC-POST: NORMAL
FVC-PRE: NORMAL
GAW %PRED: NORMAL
GAW PRED: NORMAL
GAW: NORMAL
IC %PRED: NORMAL
IC PRED: NORMAL
IC: NORMAL
MEP: NORMAL
MIP: NORMAL
MVV %PRED-PRE: NORMAL
MVV PRED: NORMAL
MVV-PRE: NORMAL
PEF %PRED-POST: NORMAL
PEF %PRED-PRE: NORMAL
PEF PRED: NORMAL
PEF%CHNG: NORMAL
PEF-POST: NORMAL
PEF-PRE: NORMAL
RAW %PRED: NORMAL
RAW PRED: NORMAL
RAW: NORMAL
RV %PRED: NORMAL
RV PRED: NORMAL
RV: NORMAL
SVC %PRED: NORMAL
SVC PRED: NORMAL
SVC: NORMAL
TLC %PRED: 117 %
TLC PRED: NORMAL
TLC: NORMAL
VA %PRED: NORMAL
VA PRED: NORMAL
VA: NORMAL
VTG %PRED: NORMAL
VTG PRED: NORMAL
VTG: NORMAL

## 2020-11-03 PROCEDURE — 94060 EVALUATION OF WHEEZING: CPT

## 2020-11-03 PROCEDURE — 94726 PLETHYSMOGRAPHY LUNG VOLUMES: CPT

## 2020-11-03 PROCEDURE — 94729 DIFFUSING CAPACITY: CPT

## 2020-11-03 ASSESSMENT — PULMONARY FUNCTION TESTS
FEV1_PERCENT_PREDICTED_POST: 79
FEV1/FVC_POST: 96
FEV1/FVC_PRE: 92
FEV1_PERCENT_PREDICTED_PRE: 77

## 2020-11-23 PROBLEM — J45.30 MILD PERSISTENT ASTHMA WITHOUT COMPLICATION: Status: ACTIVE | Noted: 2020-11-23

## 2020-11-25 ENCOUNTER — OFFICE VISIT (OUTPATIENT)
Dept: BARIATRICS/WEIGHT MGMT | Age: 55
End: 2020-11-25
Payer: COMMERCIAL

## 2020-11-25 VITALS
WEIGHT: 269.7 LBS | DIASTOLIC BLOOD PRESSURE: 84 MMHG | HEART RATE: 111 BPM | BODY MASS INDEX: 43.34 KG/M2 | OXYGEN SATURATION: 98 % | HEIGHT: 66 IN | TEMPERATURE: 98 F | SYSTOLIC BLOOD PRESSURE: 124 MMHG | RESPIRATION RATE: 16 BRPM

## 2020-11-25 PROCEDURE — 99213 OFFICE O/P EST LOW 20 MIN: CPT | Performed by: SURGERY

## 2020-11-25 ASSESSMENT — ENCOUNTER SYMPTOMS
PHOTOPHOBIA: 0
BACK PAIN: 1
ANAL BLEEDING: 0
ABDOMINAL PAIN: 1
SHORTNESS OF BREATH: 1
VOMITING: 0
NAUSEA: 0
ABDOMINAL DISTENTION: 1
WHEEZING: 0
TROUBLE SWALLOWING: 0
BLOOD IN STOOL: 0
DIARRHEA: 0
COLOR CHANGE: 0
COUGH: 0
VOICE CHANGE: 0
CONSTIPATION: 0
SORE THROAT: 0

## 2020-11-25 NOTE — PROGRESS NOTES
BARIATRIC SURGERY OFFICE NOTE    SUBJECTIVE:    Patient presenting today originally referred from Gerber Ramirez MD, for   Chief Complaint   Patient presents with   [facility] Weight Management     4th WM visit EGD consent   . HPI: Kaity Villa is a 54 y.o. female being seen for follow up for bariatric weight loss surgery. Dawn'slast month weight gain/loss was + 4 Lbs. Lost -4 Lbs total over the past 3 months -11+7Lbs    Patient dines out to a sit down restaurant 3 times per week. Patient eats fast food meals 3 times per week. Drinks mostly water    Total daily calories: 1500 Kcal.      Exercises 15mins 20 min 7  Times per week. Addressed the status of the following co-morbidities:   1. HTN  improving. 2. Asthma  worsening and started Symbicort. 3. Arthritis  worsening. Will try to do better D/E. Walks 10-15 min     Thoroughly reviewed the patient's medical history, family history, social history and review of systems with the patient today in theoffice. Please see medical record for pertinent positives. Past Medical History:   Diagnosis Date    Hypertension       Past Surgical History:   Procedure Laterality Date    ENDOMETRIAL ABLATION N/A 07/2020    TONSILLECTOMY      URETHRAL STRICTURE DILATATION      WISDOM TOOTH EXTRACTION       Current Outpatient Medications   Medication Sig Dispense Refill    budesonide-formoterol (SYMBICORT) 160-4.5 MCG/ACT AERO Inhale 2 puffs into the lungs 2 times daily 1 Inhaler 5    albuterol sulfate HFA (VENTOLIN HFA) 108 (90 Base) MCG/ACT inhaler Inhale 2 puffs into the lungs every 6 hours as needed for Wheezing      amLODIPine (NORVASC) 2.5 MG tablet TAKE 1 TABLET BY MOUTH ONE TIME A DAY      montelukast (SINGULAIR) 10 MG tablet TAKE 1 TABLET BY MOUTH AT BEDTIME      acetaminophen (AMINOFEN) 325 MG tablet Take 1 tablet by mouth every 4 hours as needed for Pain. 120 tablet 3     No current facility-administered medications for this visit. Allergies   Allergen Reactions    Aspirin     Penicillins     Bactrim [Sulfamethoxazole-Trimethoprim] Nausea And Vomiting           Review of Systems   Constitutional: Positive for fatigue. Negative for activity change, chills, diaphoresis and fever. HENT: Negative for sore throat, trouble swallowing and voice change. Eyes: Negative for photophobia and visual disturbance. Respiratory: Positive for shortness of breath. Negative for cough and wheezing. Cardiovascular: Positive for leg swelling. Negative for chest pain and palpitations. Gastrointestinal: Positive for abdominal distention and abdominal pain. Negative for anal bleeding, blood in stool, constipation, diarrhea, nausea and vomiting. Endocrine: Positive for polyphagia. Negative for cold intolerance, heat intolerance, polydipsia and polyuria. Genitourinary: Positive for urgency. Negative for dysuria, frequency and hematuria. Musculoskeletal: Positive for arthralgias, back pain and gait problem. Negative for joint swelling, myalgias and neck stiffness. Skin: Negative for color change and rash. Neurological: Negative for seizures, speech difficulty, light-headedness and numbness. Hematological: Negative for adenopathy. Does not bruise/bleed easily. Psychiatric/Behavioral: Positive for sleep disturbance. The patient is nervous/anxious. OBJECTIVE:    Vitals:    11/25/20 1034   BP: 124/84   Pulse: 111   Resp: 16   Temp: 98 °F (36.7 °C)   SpO2: 98%     Body mass index is 43.53 kg/m². Physical Exam  Vitals signs reviewed. Constitutional:       General: She is not in acute distress. Appearance: She is well-developed. She is not diaphoretic. HENT:      Head: Normocephalic and atraumatic. Eyes:      General: No scleral icterus. Conjunctiva/sclera: Conjunctivae normal.      Pupils: Pupils are equal, round, and reactive to light. Neck:      Musculoskeletal: Normal range of motion. Thyroid: No thyromegaly. Vascular: No JVD. Trachea: No tracheal deviation. Cardiovascular:      Rate and Rhythm: Normal rate and regular rhythm. Heart sounds: Normal heart sounds. No murmur. No friction rub. No gallop. Pulmonary:      Effort: Pulmonary effort is normal. No respiratory distress. Breath sounds: No stridor. No wheezing or rales. Chest:      Chest wall: No tenderness. Abdominal:      General: Bowel sounds are normal. There is no distension. Palpations: Abdomen is soft. There is no mass. Tenderness: There is no abdominal tenderness. There is no guarding or rebound. Musculoskeletal: Normal range of motion. General: No tenderness. Lymphadenopathy:      Cervical: No cervical adenopathy. Skin:     General: Skin is warm and dry. Coloration: Skin is not pale. Findings: No erythema or rash. Neurological:      Mental Status: She is alert and oriented to person, place, and time. Cranial Nerves: No cranial nerve deficit. Coordination: Coordination normal.   Psychiatric:         Behavior: Behavior normal.         Thought Content: Thought content normal.         Judgment: Judgment normal.                 ASSESSMENT & PLAN:    1. Morbid obesity with BMI of 40.0-44.9, adult ([facility] [address]         D/E, needs the help of her . .      For EGD, consented today. Cardiac pending clearance, had the stress test, and pulmonology cleared, Psych still pending. Patient was encouraged to journal all food intake. Keep calorie level atapproximately 3833-5781. Protein intake is to be a minimum of 60-80 grams per day. Water drinking was encouraged with a goal of 64oz-128oz daily. Beverages to be calorie free except for milk. Every other beverage should bewater. They are to avoid soda. Continue to increase level of physical activity. I counseled the patient regarding diet and exercise, in preparation for her planned Robotic Sleeve Gastrectomy.     Counting calories, complying with the dietitian's recommendations, and complying with the preoperative workup including the dietitian counseling, exercise physiologist counseling,cardiologist evaluation and pre-operative optimization, pulmonologist evaluation and pre operative optimization, pre operative EGD evaluation. The patient expressed understanding and willingness to comply nicely; allquestions and concerns addressed in details. Patient counseled on the risks, benefits, and alternatives oftreatment plan at length while in the office today. Patient states an understanding and willingness to proceed with the plan. Follow Up:  Return in about 2 weeks (around 12/9/2020) for EGD.       Khloe Higgins MD, FACS, FICS  Member of the [employer] of [employer] and [employer]    [phone]    11/25/20

## 2020-12-08 ENCOUNTER — TELEPHONE (OUTPATIENT)
Dept: BARIATRICS/WEIGHT MGMT | Age: 55
End: 2020-12-08

## 2020-12-08 NOTE — TELEPHONE ENCOUNTER
SPOKE  [address] (EGKIKO) SCHEDULED @ [facility].  NOTIFIED OF DATES, TIMES AND LOCATION    PHONE ASSESSMENT  SURGERY - 12/21/20 @ 2:00, 1:00 arrival  P/O - Call after discharge to schedule appointment    NPO AFTER MIDNIGHT  Covid-19 Culture on 12/14/20 @ 3:30  HOLD BLOOD THINNERS - Not on thinners   SENT      Spoke to Pt

## 2020-12-09 ENCOUNTER — TELEPHONE (OUTPATIENT)
Dept: BARIATRICS/WEIGHT MGMT | Age: 55
End: 2020-12-09

## 2020-12-14 ENCOUNTER — NURSE ONLY (OUTPATIENT)
Dept: SURGERY | Age: 55
End: 2020-12-14
Payer: COMMERCIAL

## 2020-12-14 ENCOUNTER — HOSPITAL ENCOUNTER (OUTPATIENT)
Age: 55
Setting detail: SPECIMEN
Discharge: HOME | End: 2020-12-14
Payer: COMMERCIAL

## 2020-12-14 ENCOUNTER — TELEMEDICINE (OUTPATIENT)
Dept: BARIATRICS/WEIGHT MGMT | Age: 55
End: 2020-12-14
Payer: COMMERCIAL

## 2020-12-14 VITALS
TEMPERATURE: 97.3 F | DIASTOLIC BLOOD PRESSURE: 60 MMHG | SYSTOLIC BLOOD PRESSURE: 92 MMHG | OXYGEN SATURATION: 97 % | HEART RATE: 96 BPM

## 2020-12-14 VITALS — WEIGHT: 265 LBS | BODY MASS INDEX: 42.77 KG/M2

## 2020-12-14 PROCEDURE — U0002 COVID-19 LAB TEST NON-CDC: HCPCS

## 2020-12-14 PROCEDURE — 99211 OFF/OP EST MAY X REQ PHY/QHP: CPT | Performed by: SURGERY

## 2020-12-14 PROCEDURE — 99213 OFFICE O/P EST LOW 20 MIN: CPT | Performed by: NURSE PRACTITIONER

## 2020-12-14 ASSESSMENT — ENCOUNTER SYMPTOMS
TROUBLE SWALLOWING: 0
DIARRHEA: 0
WHEEZING: 0
ABDOMINAL PAIN: 0
RHINORRHEA: 0
ABDOMINAL DISTENTION: 0
CHEST TIGHTNESS: 0
EYE PAIN: 0
SHORTNESS OF BREATH: 0
NAUSEA: 0

## 2020-12-14 NOTE — PROGRESS NOTES
2020    TELEHEALTH EVALUATION -- Audio/Visual (During RALKR-04 public health emergency)    HPI:    Naima Cagle (:  1965) has requested an audio/video evaluation for the following concern(s):  Patient presents today for 5th  visit. EGD is scheduled. Pulmonary in progress. Recently diagnosed with asthma. Weight 265 lbs, down another -4 lbs. Review of Systems   Constitutional: Negative. Negative for appetite change, fatigue and fever. HENT: Negative for congestion, dental problem, hearing loss, rhinorrhea and trouble swallowing. Eyes: Negative for pain. Respiratory: Negative for chest tightness, shortness of breath and wheezing. Cardiovascular: Negative for chest pain, palpitations and leg swelling. Gastrointestinal: Negative for abdominal distention, abdominal pain, diarrhea and nausea. Endocrine: Negative for cold intolerance and polydipsia. Genitourinary: Negative for difficulty urinating and frequency. Musculoskeletal: Negative for arthralgias and gait problem. Skin: Negative for rash. Allergic/Immunologic: Negative for environmental allergies. Neurological: Negative for dizziness, seizures and syncope. Hematological: Does not bruise/bleed easily. Psychiatric/Behavioral: Negative for behavioral problems and suicidal ideas. Prior to Visit Medications    Medication Sig Taking?  Authorizing Provider   budesonide-formoterol (SYMBICORT) 160-4.5 MCG/ACT AERO Inhale 2 puffs into the lungs 2 times daily  Linda Mark MD   albuterol sulfate HFA (VENTOLIN HFA) 108 (90 Base) MCG/ACT inhaler Inhale 2 puffs into the lungs every 6 hours as needed for Wheezing  Historical Provider, MD   amLODIPine (NORVASC) 2.5 MG tablet TAKE 1 TABLET BY MOUTH ONE TIME A DAY  Historical Provider, MD   montelukast (SINGULAIR) 10 MG tablet TAKE 1 TABLET BY MOUTH AT BEDTIME  Historical Provider, MD acetaminophen (AMINOFEN) 325 MG tablet Take 1 tablet by mouth every 4 hours as needed for Pain. Maite Patterson MD       Social History     Tobacco Use    Smoking status: Never Smoker    Smokeless tobacco: Never Used   Substance Use Topics    Alcohol use: Yes     Alcohol/week: 7.0 - 14.0 standard drinks     Types: 7 - 14 Cans of beer per week     Drinks per session: 1 or 2    Drug use: No            PHYSICAL EXAMINATION:  Physical Exam  Constitutional:       Appearance: She is well-developed. Comments: Obese   HENT:      Head: Normocephalic and atraumatic. Right Ear: Hearing and ear canal normal.      Left Ear: Hearing and ear canal normal.      Nose: Nose normal.      Eyes:      Conjunctiva/sclera: Conjunctivae normal.   Neck:      Musculoskeletal: Normal range of motion. Cardiovascular:   N/A  Pulmonary:      Effort: Pulmonary effort is normal.   Musculoskeletal: Normal range of motion. Comments: In all 4 extremities. Skin:     General: Skin is warm and dry. Findings: No rash. Neurological:      Mental Status: She is alert and oriented to person, place, and time. GCS: GCS eye subscore is 4. GCS verbal subscore is 5. GCS motor subscore is 6. Motor: No abnormal muscle tone. Psychiatric:         Behavior: Behavior normal.         Judgment: Judgment normal.     Limited due to virtual.     ASSESSMENT/PLAN:  1. Pre-op evaluation  - needs pre op evaluation.   - Amb External Referral To Psychology  - US ABDOMINAL LIMITED; Future    2. Morbid obesity with BMI of 40.0-44.9, adult ([facility] [address]  - She is down another -4 lbs. - Continue diet and weight loss. - US ordered. EGD scheduled. - RTC 1 month with NP.     3. Essential hypertension  - HTN stable, continue diet and weight loss. No follow-ups on file. Parth Wilde is a 54 y.o. female being evaluated by a Virtual Visit (video visit) encounter to address concerns as mentioned above. A caregiver was present when appropriate. Due to this being a TeleHealth encounter (During BNGXZ-58 public health emergency), evaluation of the following organ systems was limited: Vitals/Constitutional/EENT/Resp/CV/GI//MS/Neuro/Skin/Heme-Lymph-Imm. Pursuant to the emergency declaration under the [address] [place] [address] and the [employer] Act, this Virtual Visit was conducted with patient's (and/or legal guardian's) consent, to reduce the patient's risk of exposure to COVID-19 and provide necessary medical care. The patient (and/or legal guardian) has also been advised to contact this office for worsening conditions or problems, and seek emergency medical treatment and/or call 911 if deemed necessary. Patient identification was verified at the start of the visit: Yes    Total time spent on this encounter: 15    Services were provided through a video synchronous discussion virtually to substitute for in-person clinic visit. Patient and provider were located at their individual homes.     --JOSE ALBERTO Deshpande CNP on 12/14/2020 at 3:38 PM

## 2020-12-14 NOTE — PROGRESS NOTES
Patient collected pre-op COVID-19 screening instruction and collection supplies given to patient accordingly. Patient denies fever/cough/sob or recent travels. Patient voiced understanding of collection/quarantine instructions. COVID screening lab ordered, collection completed without difficulty, identifiers placed on specimen. AVS given at discharge. Results will be given via mychart or telephone call [facility] Dept will manage any positive test results, the procedure will be rescheduled at a later date).

## 2020-12-14 NOTE — PATIENT INSTRUCTIONS
When you are ready to discharge, we will notify your family/person with you to bring the car to the front entrance. We will take you to them after you receive all of your discharge instructions.

## 2020-12-16 LAB
SARS-COV-2: NOT DETECTED
SOURCE: NORMAL

## 2020-12-18 ENCOUNTER — ANESTHESIA EVENT (OUTPATIENT)
Dept: ENDOSCOPY | Age: 55
End: 2020-12-18
Payer: COMMERCIAL

## 2020-12-18 NOTE — ANESTHESIA PRE PROCEDURE
Department of Anesthesiology  Preprocedure Note       Name:  Lorna Stanley   Age:  54 y.o.  :  1965                                          MRN:  [id number]         Date:  2020      Surgeon: Lizzie Washington):  Arturo Knight MD    Procedure: Procedure(s):  EGD WITH BX    Medications prior to admission:   Prior to Admission medications    Medication Sig Start Date End Date Taking? Authorizing Provider   budesonide-formoterol (SYMBICORT) 160-4.5 MCG/ACT AERO Inhale 2 puffs into the lungs 2 times daily 20   Ozzie Castillo MD   albuterol sulfate HFA (VENTOLIN HFA) 108 (90 Base) MCG/ACT inhaler Inhale 2 puffs into the lungs every 6 hours as needed for Wheezing    Historical Provider, MD   amLODIPine (NORVASC) 2.5 MG tablet TAKE 1 TABLET BY MOUTH ONE TIME A DAY 8/3/20   Historical Provider, MD   montelukast (SINGULAIR) 10 MG tablet TAKE 1 TABLET BY MOUTH AT BEDTIME 8/3/20   Historical Provider, MD   acetaminophen (AMINOFEN) 325 MG tablet Take 1 tablet by mouth every 4 hours as needed for Pain. 3/26/14   Brad Orozco MD       Current medications:    No current facility-administered medications for this encounter. Current Outpatient Medications   Medication Sig Dispense Refill    budesonide-formoterol (SYMBICORT) 160-4.5 MCG/ACT AERO Inhale 2 puffs into the lungs 2 times daily 1 Inhaler 5    albuterol sulfate HFA (VENTOLIN HFA) 108 (90 Base) MCG/ACT inhaler Inhale 2 puffs into the lungs every 6 hours as needed for Wheezing      amLODIPine (NORVASC) 2.5 MG tablet TAKE 1 TABLET BY MOUTH ONE TIME A DAY      montelukast (SINGULAIR) 10 MG tablet TAKE 1 TABLET BY MOUTH AT BEDTIME      acetaminophen (AMINOFEN) 325 MG tablet Take 1 tablet by mouth every 4 hours as needed for Pain. 120 tablet 3       Allergies:     Allergies   Allergen Reactions    Bactrim [Sulfamethoxazole-Trimethoprim] Swelling     Throat swelled    Aspirin Rash    Penicillins Rash       Problem List:    Patient Active Problem List Diagnosis Code    Cellulitis L03.90    Morbid obesity with BMI of 40.0-44.9, adult ([facility]) E66.01, Z68.41    Essential hypertension I10    Mild persistent asthma without complication V61.32       Past Medical History:        Diagnosis Date    Asthma     Hypertension        Past Surgical History:        Procedure Laterality Date    ENDOMETRIAL ABLATION N/A 07/2020    TONSILLECTOMY  1969    URETHRAL STRICTURE DILATATION  1973    WISDOM TOOTH EXTRACTION         Social History:    Social History     Tobacco Use    Smoking status: Never Smoker    Smokeless tobacco: Never Used   Substance Use Topics    Alcohol use: Yes     Alcohol/week: 7.0 - 14.0 standard drinks     Types: 7 - 14 Cans of beer per week     Drinks per session: 1 or 2                                Counseling given: Not Answered      Vital Signs (Current):   Vitals:    12/16/20 1253   Weight: 260 lb (117.9 kg)   Height: 5' 6\" (1.676 m)                                              BP Readings from Last 3 Encounters:   12/14/20 92/60   11/25/20 124/84   08/12/20 122/86       NPO Status:                                                                                 BMI:   Wt Readings from Last 3 Encounters:   12/14/20 265 lb (120.2 kg)   11/25/20 269 lb 11.2 oz (122.3 kg)   11/23/20 263 lb (119.3 kg)     Body mass index is 41.97 kg/m².     CBC:   Lab Results   Component Value Date    WBC 6.4 08/17/2020    RBC 3.96 08/17/2020    HGB 13.7 08/17/2020    HCT 41.8 08/17/2020    .6 08/17/2020    RDW 12.7 08/17/2020     08/17/2020       CMP:   Lab Results   Component Value Date     08/17/2020    K 4.3 08/17/2020     08/17/2020    CO2 27 08/17/2020    BUN 12 08/17/2020    CREATININE 0.7 08/17/2020    GFRAA >60 08/17/2020    LABGLOM >60 08/17/2020    GLUCOSE 87 08/17/2020    PROT 7.4 08/17/2020    CALCIUM 10.0 08/17/2020    BILITOT 0.7 08/17/2020    ALKPHOS 103 08/17/2020    AST 29 08/17/2020    ALT 29 08/17/2020

## 2020-12-21 ENCOUNTER — HOSPITAL ENCOUNTER (OUTPATIENT)
Age: 55
Setting detail: OUTPATIENT SURGERY
Discharge: HOME | End: 2020-12-21
Attending: SURGERY | Admitting: SURGERY
Payer: COMMERCIAL

## 2020-12-21 ENCOUNTER — ANESTHESIA (OUTPATIENT)
Dept: ENDOSCOPY | Age: 55
End: 2020-12-21
Payer: COMMERCIAL

## 2020-12-21 VITALS
WEIGHT: 260 LBS | OXYGEN SATURATION: 97 % | BODY MASS INDEX: 41.78 KG/M2 | HEIGHT: 66 IN | RESPIRATION RATE: 19 BRPM | TEMPERATURE: 96.1 F | HEART RATE: 75 BPM | SYSTOLIC BLOOD PRESSURE: 153 MMHG | DIASTOLIC BLOOD PRESSURE: 93 MMHG

## 2020-12-21 VITALS — DIASTOLIC BLOOD PRESSURE: 92 MMHG | OXYGEN SATURATION: 97 % | SYSTOLIC BLOOD PRESSURE: 141 MMHG

## 2020-12-21 PROBLEM — K21.9 GERD (GASTROESOPHAGEAL REFLUX DISEASE): Status: ACTIVE | Noted: 2020-12-21

## 2020-12-21 LAB — PREGNANCY TEST URINE, POC: NEGATIVE

## 2020-12-21 PROCEDURE — 6360000002 HC RX W HCPCS

## 2020-12-21 PROCEDURE — 2580000003 HC RX 258: Performed by: ANESTHESIOLOGY

## 2020-12-21 PROCEDURE — 88305 TISSUE EXAM BY PATHOLOGIST: CPT | Performed by: PATHOLOGY

## 2020-12-21 PROCEDURE — 2709999900 HC NON-CHARGEABLE SUPPLY: Performed by: SURGERY

## 2020-12-21 PROCEDURE — 43239 EGD BIOPSY SINGLE/MULTIPLE: CPT | Performed by: SURGERY

## 2020-12-21 PROCEDURE — 7100000011 HC PHASE II RECOVERY - ADDTL 15 MIN: Performed by: SURGERY

## 2020-12-21 PROCEDURE — 88342 IMHCHEM/IMCYTCHM 1ST ANTB: CPT | Performed by: PATHOLOGY

## 2020-12-21 PROCEDURE — 7100000010 HC PHASE II RECOVERY - FIRST 15 MIN: Performed by: SURGERY

## 2020-12-21 PROCEDURE — 3700000001 HC ADD 15 MINUTES (ANESTHESIA): Performed by: SURGERY

## 2020-12-21 PROCEDURE — 3700000000 HC ANESTHESIA ATTENDED CARE: Performed by: SURGERY

## 2020-12-21 PROCEDURE — 2500000003 HC RX 250 WO HCPCS

## 2020-12-21 PROCEDURE — 3609012400 HC EGD TRANSORAL BIOPSY SINGLE/MULTIPLE: Performed by: SURGERY

## 2020-12-21 PROCEDURE — 81025 URINE PREGNANCY TEST: CPT

## 2020-12-21 RX ORDER — PROPOFOL 10 MG/ML
INJECTION, EMULSION INTRAVENOUS PRN
Status: DISCONTINUED | OUTPATIENT
Start: 2020-12-21 | End: 2020-12-21 | Stop reason: SDUPTHER

## 2020-12-21 RX ORDER — SODIUM CHLORIDE, SODIUM LACTATE, POTASSIUM CHLORIDE, CALCIUM CHLORIDE 600; 310; 30; 20 MG/100ML; MG/100ML; MG/100ML; MG/100ML
INJECTION, SOLUTION INTRAVENOUS CONTINUOUS
Status: DISCONTINUED | OUTPATIENT
Start: 2020-12-21 | End: 2020-12-21 | Stop reason: HOSPADM

## 2020-12-21 RX ORDER — SODIUM CHLORIDE 9 MG/ML
INJECTION, SOLUTION INTRAVENOUS CONTINUOUS PRN
Status: DISCONTINUED | OUTPATIENT
Start: 2020-12-21 | End: 2020-12-21

## 2020-12-21 RX ORDER — LIDOCAINE HYDROCHLORIDE 20 MG/ML
INJECTION, SOLUTION EPIDURAL; INFILTRATION; INTRACAUDAL; PERINEURAL PRN
Status: DISCONTINUED | OUTPATIENT
Start: 2020-12-21 | End: 2020-12-21 | Stop reason: SDUPTHER

## 2020-12-21 RX ADMIN — PROPOFOL 30 MG: 10 INJECTION, EMULSION INTRAVENOUS at 11:00

## 2020-12-21 RX ADMIN — PROPOFOL 20 MG: 10 INJECTION, EMULSION INTRAVENOUS at 11:04

## 2020-12-21 RX ADMIN — PROPOFOL 20 MG: 10 INJECTION, EMULSION INTRAVENOUS at 11:06

## 2020-12-21 RX ADMIN — PROPOFOL 20 MG: 10 INJECTION, EMULSION INTRAVENOUS at 11:02

## 2020-12-21 RX ADMIN — LIDOCAINE HYDROCHLORIDE 100 MG: 20 INJECTION, SOLUTION EPIDURAL; INFILTRATION; INTRACAUDAL; PERINEURAL at 10:58

## 2020-12-21 RX ADMIN — PROPOFOL 30 MG: 10 INJECTION, EMULSION INTRAVENOUS at 11:08

## 2020-12-21 RX ADMIN — PROPOFOL 30 MG: 10 INJECTION, EMULSION INTRAVENOUS at 11:03

## 2020-12-21 RX ADMIN — SODIUM CHLORIDE, POTASSIUM CHLORIDE, SODIUM LACTATE AND CALCIUM CHLORIDE: 600; 310; 30; 20 INJECTION, SOLUTION INTRAVENOUS at 10:55

## 2020-12-21 RX ADMIN — PROPOFOL 30 MG: 10 INJECTION, EMULSION INTRAVENOUS at 11:05

## 2020-12-21 RX ADMIN — PROPOFOL 70 MG: 10 INJECTION, EMULSION INTRAVENOUS at 10:58

## 2020-12-21 RX ADMIN — SODIUM CHLORIDE, POTASSIUM CHLORIDE, SODIUM LACTATE AND CALCIUM CHLORIDE: 600; 310; 30; 20 INJECTION, SOLUTION INTRAVENOUS at 08:41

## 2020-12-21 NOTE — ANESTHESIA POSTPROCEDURE EVALUATION
Department of Anesthesiology  Postprocedure Note    Patient: Joesph Felder  MRN: [id number]  YOB: 1965  Date of evaluation: 12/21/2020  Time:  11:22 AM     Procedure Summary     Date: 12/21/20 Room / Location: [address] [place] [address]    Anesthesia Start: 1055 Anesthesia Stop:     Procedure: EGD BIOPSY (N/A ) Diagnosis: (MORBID OBESITY)    Surgeons: Parmjit Banks MD Responsible Provider: Wendi Gray MD    Anesthesia Type: MAC ASA Status: 3          Anesthesia Type: MAC    Eusebio Phase I:      Eusebio Phase II:      Last vitals: Reviewed and per EMR flowsheets.        Anesthesia Post Evaluation    Patient location during evaluation: PACU  Patient participation: complete - patient participated  Level of consciousness: awake and alert  Pain score: 0  Airway patency: patent  Nausea & Vomiting: no nausea and no vomiting  Complications: no  Cardiovascular status: hemodynamically stable  Respiratory status: acceptable, nasal cannula, spontaneous ventilation and nonlabored ventilation  Hydration status: euvolemic

## 2020-12-21 NOTE — PROGRESS NOTES
Preprocedure questions addressed with patient including NPO status, medications, and allergies. Patient taken to endoscopy procedure room via stretcher.

## 2020-12-21 NOTE — FLOWSHEET NOTE
visited with patient. Patient stated that she has no needs at this time. 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  provided information about [id number] Sum[address] Orozco and encouraged patient to contact the  as needed. Rev. Ryan,  [address]       12/21/20 8566   Encounter Summary   Services provided to: Patient   Referral/Consult From: Rounding   Support System Spouse;Parent   Place of [facility] orthodox   (not provided)   Continue Visiting No   Complexity of Encounter Low   Length of Encounter 15 minutes   Spiritual Assessment Completed Yes   Routine   Type Pre-procedure   Assessment Calm; Approachable   Intervention Active listening;Explored feelings, thoughts, concerns;Explored coping resources;Nurtured hope   Spiritual/[employer]alism   Type Spiritual support

## 2020-12-21 NOTE — PROGRESS NOTES
RECEIVED PATIENT FROM ENDO. REPORT AT BEDSIDE WITH JEFFRY HUNT. PATIENT ALERT AND ORIENTED. VS STABLE. WILL CONTINUE TO MONITOR. TOLERATING WATER AT THIS TIME.

## 2020-12-21 NOTE — H&P
HISTORY AND PHYSICAL EXAM    Date of Admission:  12/21/2020    PCP:  Norah Gay MD    Chief Complaint / History of Present Illness:  Lise Sanchez is a very pleasant 54 y.o. female who presents today for her preop EGD eval before her bariatric procedure. As noted her Body mass index is 41.97 kg/m². with significant co-morbidities as below. The patient has been complying with the pre-operative workup, lifestyle modifications and changes with the bariatric clinic, including:  Dietitian evaluation and counseling, psychologist evaluation and counseling, Exercise physiologist evaluation and counseling, cardiac preoperative clearance and optimization, pulmonology preoperative clearance and optimization, today will proceed with preoperative EGD for GERD, morbid obesity: Body mass index is 41.97 kg/m². , in preparation for a bariatric procedure; to r/o GERD, Hiatal Hernia, Peptic Ulcer Disease, Gastroparesis, Esophageal dysmotility; etc.    All risks and benefits, potential complications and possible alternatives discussed, and agreeable to proceed. PMH:   has a past medical history of Asthma and Hypertension. PSH:   has a past surgical history that includes Urethra dilation (1973); [place] tooth extraction; Endometrial ablation (N/A, 07/2020); and Tonsillectomy (1969). Allergies: Allergies   Allergen Reactions    Bactrim [Sulfamethoxazole-Trimethoprim] Swelling     Throat swelled    Aspirin Rash    Penicillins Rash        Home Meds:    Prior to Admission medications    Medication Sig Start Date End Date Taking?  Authorizing Provider   albuterol sulfate HFA (VENTOLIN HFA) 108 (90 Base) MCG/ACT inhaler Inhale 2 puffs into the lungs every 6 hours as needed for Wheezing   Yes Historical Provider, MD   amLODIPine (NORVASC) 2.5 MG tablet TAKE 1 TABLET BY MOUTH ONE TIME A DAY 8/3/20  Yes Historical Provider, MD   montelukast (SINGULAIR) 10 MG tablet TAKE 1 TABLET BY MOUTH AT BEDTIME 8/3/20  Yes Historical Provider, MD   acetaminophen (AMINOFEN) 325 MG tablet Take 1 tablet by mouth every 4 hours as needed for Pain. 3/26/14  Yes Sylvia Colin MD   budesonide-formoterol [facility]) 160-4.5 MCG/ACT AERO Inhale 2 puffs into the lungs 2 times daily 11/23/20   Isabell Nice MD        [facility] Meds:    Current Facility-Administered Medications   Medication Dose Route Frequency Provider Last Rate Last Admin    lactated ringers infusion   Intravenous Continuous Keren Primrose, DO 50 mL/hr at 12/21/20 0841 New Bag at 12/21/20 0841       Social History / Family History:        Social History     Socioeconomic History    Marital status:      Spouse name: None    Number of children: None    Years of education: None    Highest education level: None   Occupational History    None   Social Needs    Financial resource strain: None    Food insecurity     Worry: None     Inability: None    Transportation needs     Medical: None     Non-medical: None   Tobacco Use    Smoking status: Never Smoker    Smokeless tobacco: Never Used   Substance and Sexual Activity    Alcohol use:  Yes     Alcohol/week: 7.0 - 14.0 standard drinks     Types: 7 - 14 Cans of beer per week     Drinks per session: 1 or 2    Drug use: No    Sexual activity: None   Lifestyle    Physical activity     Days per week: None     Minutes per session: None    Stress: None   Relationships    Social connections     Talks on phone: None     Gets together: None     Attends Congregation service: None     Active member of club or organization: None     Attends meetings of clubs or organizations: None     Relationship status: None    Intimate partner violence     Fear of current or ex partner: None     Emotionally abused: None     Physically abused: None     Forced sexual activity: None   Other Topics Concern    None   Social History Narrative    None       Review of Systems:  Constitutional: Negative for fever, chills, diaphoresis, appetite change and fatigue. HENT: Negative for sore throat, trouble swallowing and voice change. Respiratory: Negative for cough, positive for shortness of breath no wheezing. Cardiovascular: Negative for chest pain positive for SOB with one flight of stairs exertion, no pitting LE edema. Gastrointestinal: Negative for nausea, vomiting, diarrhea, constipation, blood in stool, abdominal distention, anal bleeding or rectal pain. Musculoskeletal: Negative for joint swelling and arthralgias. Skin: Warm and dry, well perfused. Neurological: Negative for seizures, syncope, speech difficulty and weakness. Hematological/Lymphatic: Negative for adenopathy. No history of DVT/PE. Does not bruise/bleed easily. Psychiatric/Behavioral: Negative for agitation. Physical Exam:  Vital Signs:   Vitals:    12/21/20 0824   BP: (!) 173/81   Pulse: 85   Resp: 16   SpO2: 96%     General appearance: Pt Alert and oriented, in no apparent acute distress. HEENT:  ALLYSON, EOMI, No JVDs, Bruits, Megalies. Lungs: Clear to auscultation bilaterally. Heart: Regular rate and rhythm, S1, S2 normal, no murmur, rub or gallop. Abdomen: Non tender. Non distended. Positive bowel sounds. No hernias noted, no masses palpable. Extremities: Warm, well perfused, no cyanosis or edema. Skin: Skin color, texture, turgor normal.  Neurologic: Grossly normal, Cranial nerves from II to XII intact. Radiologic / Imaging / TESTING  No results found.       Labs:    Recent Results (from the past 24 hour(s))   POC Pregnancy Urine Qual    Collection Time: 12/21/20  8:29 AM   Result Value Ref Range    Preg Test, Ur NEGATIVE NEGATIVE         Diagnosis:  Patient Active Problem List   Diagnosis    Cellulitis    Morbid obesity with BMI of 40.0-44.9, adult ([facility] [address]    Essential hypertension    Mild persistent asthma without complication           Assessment & Plan:    Lavinia Decker is a very pleasant 54 y.o. female who presents today for her preop EGD eval before her bariatric procedure. As noted her Body mass index is 41.97 kg/m². with significant co-morbidities as below. The patient has been complying with the pre-operative workup, lifestyle modifications and changes with the bariatric clinic, including:  Dietitian evaluation and counseling, psychologist evaluation and counseling, Exercise physiologist evaluation and counseling, cardiac preoperative clearance and optimization, pulmonology preoperative clearance and optimization, today will proceed with preoperative EGD for GERD, morbid obesity: Body mass index is 41.97 kg/m². , in preparation for a bariatric procedure; to r/o GERD, Hiatal Hernia, Peptic Ulcer Disease, Gastroparesis, Esophageal dysmotility; etc.    All risks and benefits, potential complications and possible alternatives discussed, and agreeable to proceed.     ____________________________________________    Trini Ward MD, FACS, FICS    12/21/2020  10:46 AM

## 2020-12-21 NOTE — PROGRESS NOTES
Patient tolerated procedure well. Patient awake and alert, no pain or needs voiced. Patient returned to [facility] via stretcher. Bedside report given to Laura Taveras RN.

## 2020-12-28 ENCOUNTER — HOSPITAL ENCOUNTER (OUTPATIENT)
Dept: ULTRASOUND IMAGING | Age: 55
Discharge: HOME | End: 2020-12-28
Payer: COMMERCIAL

## 2020-12-28 PROCEDURE — 76705 ECHO EXAM OF ABDOMEN: CPT

## 2021-01-21 ENCOUNTER — TELEMEDICINE (OUTPATIENT)
Dept: BARIATRICS/WEIGHT MGMT | Age: 56
End: 2021-01-21
Payer: COMMERCIAL

## 2021-01-21 DIAGNOSIS — Z01.818 PRE-OP EVALUATION: Primary | ICD-10-CM

## 2021-01-21 DIAGNOSIS — E66.01 MORBID OBESITY WITH BMI OF 40.0-44.9, ADULT (HCC): ICD-10-CM

## 2021-01-21 PROCEDURE — 99214 OFFICE O/P EST MOD 30 MIN: CPT | Performed by: NURSE PRACTITIONER

## 2021-01-21 ASSESSMENT — ENCOUNTER SYMPTOMS
NAUSEA: 0
SHORTNESS OF BREATH: 0
ABDOMINAL DISTENTION: 0
ABDOMINAL PAIN: 0
WHEEZING: 0
CHEST TIGHTNESS: 0
RHINORRHEA: 0
TROUBLE SWALLOWING: 0
EYE PAIN: 0
DIARRHEA: 0

## 2021-01-21 NOTE — PROGRESS NOTES
2021    TELEHEALTH EVALUATION -- Audio/Visual (During CQERL-82 public health emergency)    HPI:    Xavi Mo (:  1965) has requested an audio/video evaluation for the following concern(s):  Patient presents today for follow up visit. She presents today for 6th  visit. EGD completed. See below. Weight 260 lbs. Review of Systems   Constitutional: Negative. Negative for appetite change, fatigue and fever. HENT: Negative for congestion, dental problem, hearing loss, rhinorrhea and trouble swallowing. Eyes: Negative for pain. Respiratory: Negative for chest tightness, shortness of breath and wheezing. Cardiovascular: Negative for chest pain, palpitations and leg swelling. Gastrointestinal: Negative for abdominal distention, abdominal pain, diarrhea and nausea. Endocrine: Negative for cold intolerance and polydipsia. Genitourinary: Negative for difficulty urinating and frequency. Musculoskeletal: Negative for arthralgias and gait problem. Skin: Negative for rash. Allergic/Immunologic: Negative for environmental allergies. Neurological: Negative for dizziness, seizures and syncope. Hematological: Does not bruise/bleed easily. Psychiatric/Behavioral: Negative for behavioral problems and suicidal ideas. Prior to Visit Medications    Medication Sig Taking? Authorizing Provider   budesonide-formoterol (SYMBICORT) 160-4.5 MCG/ACT AERO Inhale 2 puffs into the lungs 2 times daily  Judah Kimble MD   albuterol sulfate HFA (VENTOLIN HFA) 108 (90 Base) MCG/ACT inhaler Inhale 2 puffs into the lungs every 6 hours as needed for Wheezing  Historical Provider, MD   amLODIPine (NORVASC) 2.5 MG tablet TAKE 1 TABLET BY MOUTH ONE TIME A DAY  Historical Provider, MD   montelukast (SINGULAIR) 10 MG tablet TAKE 1 TABLET BY MOUTH AT BEDTIME  Historical Provider, MD   acetaminophen (AMINOFEN) 325 MG tablet Take 1 tablet by mouth every 4 hours as needed for Pain.   Domenico Mohan MD Social History     Tobacco Use    Smoking status: Never Smoker    Smokeless tobacco: Never Used   Substance Use Topics    Alcohol use: Yes     Alcohol/week: 7.0 - 14.0 standard drinks     Types: 7 - 14 Cans of beer per week     Drinks per session: 1 or 2    Drug use: No        Allergies   Allergen Reactions    Bactrim [Sulfamethoxazole-Trimethoprim] Swelling     Throat swelled    Aspirin Rash    Penicillins Rash   ,   Past Medical History:   Diagnosis Date    Asthma     Hypertension    ,   Past Surgical History:   Procedure Laterality Date    ENDOMETRIAL ABLATION N/A 07/2020    TONSILLECTOMY  1969    UPPER GASTROINTESTINAL ENDOSCOPY N/A 12/21/2020    EGD BIOPSY performed by Alin Yanez MD at [facility] EXTRACTION     ,   Social History     Tobacco Use    Smoking status: Never Smoker    Smokeless tobacco: Never Used   Substance Use Topics    Alcohol use: Yes     Alcohol/week: 7.0 - 14.0 standard drinks     Types: 7 - 14 Cans of beer per week     Drinks per session: 1 or 2    Drug use: No       PHYSICAL EXAMINATION:  Physical Exam  Constitutional:       Appearance: She is well-developed. Comments: Obese   HENT:      Head: Normocephalic and atraumatic. Right Ear: Hearing and ear canal normal.      Left Ear: Hearing and ear canal normal.      Nose: Nose normal.      Eyes:      Conjunctiva/sclera: Conjunctivae normal.   Neck:      Musculoskeletal: Normal range of motion. Cardiovascular:   N/A  Pulmonary:      Effort: Pulmonary effort is normal.   Musculoskeletal: Normal range of motion. Comments: In all 4 extremities. Skin:     General: Skin is warm and dry. Findings: No rash. Neurological:      Mental Status: She is alert and oriented to person, place, and time. GCS: GCS eye subscore is 4. GCS verbal subscore is 5. GCS motor subscore is 6. Motor: No abnormal muscle tone.    Psychiatric: Behavior: Behavior normal.         Judgment: Judgment normal.             Final Pathologic Diagnosis:   A. Stomach, biopsy:   -     Mild chronic gastritis. -     H.pylori immunohistochemical stain is negative. B. Esophagus, z-line, biopsy:   -     Oxyntocardiac mucosa with chronic inflammation. -     Squamous mucosa with reactive changes and up to 20   eosinophils per high power field. -     No intestinal metaplasia or dysplasia identified. FINDINGS:   Technically, there is significant degradation of image quality related to   patient body habitus and overlying bowel gas.       LIVER: Wyn Corwin is heterogeneous increased echogenicity of the liver.  Finding   is nonspecific, however most commonly seen with changes of fatty   infiltration.  No definite focal abnormality of the visualized portion of the   liver is identified.  No evidence of intrahepatic biliary ductal dilatation.       BILIARY SYSTEM:  Gallbladder appears contracted.  No evidence of   cholelithiasis, wall thickening, or pericholecystic fluid.  Anteriorly,   gallbladder wall measures 0.21 cm in thickness.  Negative ultrasonographic   Craig's sign.  Common bile duct is within normal limits measuring 0.38 cm in   AP diameter in the region of the ruel hepatis.       RIGHT KIDNEY: Right kidney is grossly unremarkable without evidence of   hydronephrosis. Right kidney measures 10.5 cm in length.       PANCREAS:  Visualized portions of the pancreas are unremarkable.           Impression   1. Technically limited examination due to patient body habitus and overlying   bowel gas. 2. Increased echogenicity of the liver.  Finding is nonspecific, however most   commonly seen with changes of fatty infiltration. 3. Findings suggestive of presence of contracted gallbladder with no definite   evidence of cholelithiasis. ASSESSMENT/PLAN:  1. Pre-op evaluation  - Still needs luna/drug testing.   - Nicotine and Metabolites;  Future

## 2021-02-18 ENCOUNTER — TELEMEDICINE (OUTPATIENT)
Dept: BARIATRICS/WEIGHT MGMT | Age: 56
End: 2021-02-18
Payer: COMMERCIAL

## 2021-02-18 DIAGNOSIS — I10 ESSENTIAL HYPERTENSION: Primary | ICD-10-CM

## 2021-02-18 DIAGNOSIS — E66.01 MORBID OBESITY WITH BMI OF 40.0-44.9, ADULT (HCC): ICD-10-CM

## 2021-02-18 PROCEDURE — 99213 OFFICE O/P EST LOW 20 MIN: CPT | Performed by: NURSE PRACTITIONER

## 2021-02-18 ASSESSMENT — ENCOUNTER SYMPTOMS
DIARRHEA: 0
CHEST TIGHTNESS: 0
WHEEZING: 0
ABDOMINAL DISTENTION: 0
NAUSEA: 0
RHINORRHEA: 0
EYE PAIN: 0
TROUBLE SWALLOWING: 0
SHORTNESS OF BREATH: 0
ABDOMINAL PAIN: 0

## 2021-02-18 NOTE — PROGRESS NOTES
2021    TELEHEALTH EVALUATION -- Audio/Visual (During VRPRA-54 public health emergency)    HPI:    Shmuel Lockhart (:  1965) has requested an audio/video evaluation for the following concern(s):  Patient presents today for 7th  visit. Has new treadmill, has been exercising. Weight 260 lbs. Review of Systems   Constitutional: Negative. Negative for appetite change, fatigue and fever. HENT: Negative for congestion, dental problem, hearing loss, rhinorrhea and trouble swallowing. Eyes: Negative for pain. Respiratory: Negative for chest tightness, shortness of breath and wheezing. Cardiovascular: Negative for chest pain, palpitations and leg swelling. Gastrointestinal: Negative for abdominal distention, abdominal pain, diarrhea and nausea. Endocrine: Negative for cold intolerance and polydipsia. Genitourinary: Negative for difficulty urinating and frequency. Musculoskeletal: Negative for arthralgias and gait problem. Skin: Negative for rash. Allergic/Immunologic: Negative for environmental allergies. Neurological: Negative for dizziness, seizures and syncope. Hematological: Does not bruise/bleed easily. Psychiatric/Behavioral: Negative for behavioral problems and suicidal ideas. Prior to Visit Medications    Medication Sig Taking? Authorizing Provider   budesonide-formoterol (SYMBICORT) 160-4.5 MCG/ACT AERO Inhale 2 puffs into the lungs 2 times daily  John Howard MD   albuterol sulfate HFA (VENTOLIN HFA) 108 (90 Base) MCG/ACT inhaler Inhale 2 puffs into the lungs every 6 hours as needed for Wheezing  Historical Provider, MD   amLODIPine (NORVASC) 2.5 MG tablet TAKE 1 TABLET BY MOUTH ONE TIME A DAY  Historical Provider, MD   montelukast (SINGULAIR) 10 MG tablet TAKE 1 TABLET BY MOUTH AT BEDTIME  Historical Provider, MD   acetaminophen (AMINOFEN) 325 MG tablet Take 1 tablet by mouth every 4 hours as needed for Pain.   Sara Burnham MD       Social History Tobacco Use    Smoking status: Never Smoker    Smokeless tobacco: Never Used   Substance Use Topics    Alcohol use: Yes     Alcohol/week: 7.0 - 14.0 standard drinks     Types: 7 - 14 Cans of beer per week     Drinks per session: 1 or 2    Drug use: No        Allergies   Allergen Reactions    Bactrim [Sulfamethoxazole-Trimethoprim] Swelling     Throat swelled    Aspirin Rash    Penicillins Rash   ,   Past Medical History:   Diagnosis Date    Asthma     Hypertension    ,   Past Surgical History:   Procedure Laterality Date    ENDOMETRIAL ABLATION N/A 07/2020    TONSILLECTOMY  1969    UPPER GASTROINTESTINAL ENDOSCOPY N/A 12/21/2020    EGD BIOPSY performed by Yuniel Castaneda MD at [address] [place] [address] EXTRACTION     ,   Social History     Tobacco Use    Smoking status: Never Smoker    Smokeless tobacco: Never Used   Substance Use Topics    Alcohol use: Yes     Alcohol/week: 7.0 - 14.0 standard drinks     Types: 7 - 14 Cans of beer per week     Drinks per session: 1 or 2    Drug use: No       PHYSICAL EXAMINATION:  Physical Exam  Constitutional:       Appearance: She is well-developed. Comments: Obese   HENT:      Head: Normocephalic and atraumatic. Right Ear: Hearing and ear canal normal.      Left Ear: Hearing and ear canal normal.      Nose: Nose normal.      Eyes:      Conjunctiva/sclera: Conjunctivae normal.   Neck:      Musculoskeletal: Normal range of motion. Cardiovascular:   N/A  Pulmonary:      Effort: Pulmonary effort is normal.   Musculoskeletal: Normal range of motion. Comments: In all 4 extremities. Skin:     General: Skin is warm and dry. Findings: No rash. Neurological:      Mental Status: She is alert and oriented to person, place, and time. GCS: GCS eye subscore is 4. GCS verbal subscore is 5. GCS motor subscore is 6. Motor: No abnormal muscle tone.    Psychiatric: Behavior: Behavior normal.         Judgment: Judgment normal.     Limited due to virtual visit. ASSESSMENT/PLAN:  1. Morbid obesity with BMI of 40.0-44.9, adult ([address]  - Continue working on diet and weight loss. -  weight stable, still down -11 lbs. - Still needs uds/luna and psych.   - Call on psych and cardiac.   - RTC 1 month with NP, in person. 2. Essential hypertension  - Stable, on norvasc.   - PCP managing. Return in about 1 month (around 3/18/2021) for Weight Check. Frederic Ward is a 54 y.o. female being evaluated by a Virtual Visit (video visit) encounter to address concerns as mentioned above. A caregiver was present when appropriate. Due to this being a TeleHealth encounter (During YXDGB-21 public health emergency), evaluation of the following organ systems was limited: Vitals/Constitutional/EENT/Resp/CV/GI//MS/Neuro/Skin/Heme-Lymph-Imm. Pursuant to the emergency declaration under the [address] [place] [address], [address] [place] [address] authority and the [employer] Act, this Virtual Visit was conducted with patient's (and/or legal guardian's) consent, to reduce the patient's risk of exposure to COVID-19 and provide necessary medical care. The patient (and/or legal guardian) has also been advised to contact this office for worsening conditions or problems, and seek emergency medical treatment and/or call 911 if deemed necessary. Patient identification was verified at the start of the visit: Yes    Total time spent on this encounter: 15    Services were provided through a video synchronous discussion virtually to substitute for in-person clinic visit. Patient and provider were located at their individual homes.     --JOSE ALBERTO Farias - CNP on 2/18/2021 at 5:52 PM

## 2021-03-04 ENCOUNTER — HOSPITAL ENCOUNTER (OUTPATIENT)
Age: 56
Setting detail: SPECIMEN
Discharge: HOME | End: 2021-03-04
Payer: COMMERCIAL

## 2021-03-04 LAB
AMPHETAMINES: NEGATIVE
BARBITURATE SCREEN URINE: NEGATIVE
BENZODIAZEPINE SCREEN, URINE: NEGATIVE
CANNABINOID SCREEN URINE: NEGATIVE
COCAINE METABOLITE: NEGATIVE
OPIATES, URINE: NEGATIVE
OXYCODONE: NEGATIVE
PHENCYCLIDINE, URINE: NEGATIVE

## 2021-03-04 PROCEDURE — 80307 DRUG TEST PRSMV CHEM ANLYZR: CPT

## 2021-03-04 PROCEDURE — G0480 DRUG TEST DEF 1-7 CLASSES: HCPCS

## 2021-03-10 LAB
3-OH-COTININE URINE: <50 NG/ML
ANABASINE URINE: <3 NG/ML
COTININE, URINE: <5 NG/ML
NICOTINE AND METABOLITES: 18 NG/ML
NORNICOTINE URINE: <2 NG/ML

## 2021-03-22 ENCOUNTER — VIRTUAL VISIT (OUTPATIENT)
Dept: BARIATRICS/WEIGHT MGMT | Age: 56
End: 2021-03-22

## 2021-03-22 VITALS — WEIGHT: 258 LBS | BODY MASS INDEX: 41.64 KG/M2

## 2021-03-22 DIAGNOSIS — I10 ESSENTIAL HYPERTENSION: Primary | ICD-10-CM

## 2021-03-22 DIAGNOSIS — E66.01 MORBID OBESITY WITH BMI OF 40.0-44.9, ADULT (HCC): ICD-10-CM

## 2021-03-22 PROCEDURE — 99443 PR PHYS/QHP TELEPHONE EVALUATION 21-30 MIN: CPT | Performed by: NURSE PRACTITIONER

## 2021-03-22 NOTE — PROGRESS NOTES
Shmuel Lockhart is a 54 y.o. female evaluated via telephone on 3/22/2021. Flipped to telephone visit. Consent:  She and/or health care decision maker is aware that that she may receive a bill for this telephone service, depending on her insurance coverage, and has provided verbal consent to proceed: Yes    Documentation:  I communicated with the patient and/or health care decision maker about current diet, weight loss. weight today 258 lbs, down another -2 lbs. Details of this discussion including any medical advice provided: continue diet, weight loss. File completed. - Patient is an excellent candidate for bariatric surgery and has completed all necessary requirements of  program and has lost a total of -13 lbs throughout the bariatric program. The patient has had all pre-operative clearances completed and will send file to insurance for approval.   -  Female patient post menopausal.   - Discussed pre-surgical liver shrinking diet, hospital course, and post op diet stages today with patient in length. Reviewed surgical pictures and all questions answered. Will be scheduled with bariatric surgeon prior to surgery as well, to discuss consent and answer all questions. Patient aware to review new patient binder and come prepared with any further surgical questions. - states  occasionally smokes cigars. Aware to refrain from any second hand smoke exposure etc. And discussed risks. - Not elevated enough for primary tobacco use. I affirm this is a Patient Initiated Episode with a Patient who has not had a related appointment within my department in the past 7 days or scheduled within the next 24 hours.     Patient identification was verified at the start of the visit: Yes    Total Time: minutes: 21-30 minutes    The visit was conducted pursuant to the emergency declaration under the [facility] [place] [address] waiver authority and the [employer] Supplemental Appropriations Act. Patient identification was verified, and a caregiver was present when appropriate. The patient was located in a state where the provider was credentialed to provide care.     Arabella Mercado Dr

## 2021-03-23 ENCOUNTER — TELEPHONE (OUTPATIENT)
Dept: BARIATRICS/WEIGHT MGMT | Age: 56
End: 2021-03-23

## 2021-03-23 NOTE — TELEPHONE ENCOUNTER
Called patient left message that file is ready to be sent to insurance, however she has a balance on her program fee.  Awaiting payment then can send to insurance

## 2021-03-26 ENCOUNTER — TELEPHONE (OUTPATIENT)
Dept: BARIATRICS/WEIGHT MGMT | Age: 56
End: 2021-03-26

## 2021-04-16 ENCOUNTER — OFFICE VISIT (OUTPATIENT)
Dept: BARIATRICS/WEIGHT MGMT | Age: 56
End: 2021-04-16
Payer: COMMERCIAL

## 2021-04-16 VITALS
BODY MASS INDEX: 41.54 KG/M2 | OXYGEN SATURATION: 99 % | WEIGHT: 258.5 LBS | HEART RATE: 76 BPM | HEIGHT: 66 IN | DIASTOLIC BLOOD PRESSURE: 88 MMHG | SYSTOLIC BLOOD PRESSURE: 120 MMHG

## 2021-04-16 DIAGNOSIS — E66.01 MORBID OBESITY WITH BMI OF 40.0-44.9, ADULT (HCC): Primary | ICD-10-CM

## 2021-04-16 PROCEDURE — 99213 OFFICE O/P EST LOW 20 MIN: CPT | Performed by: SURGERY

## 2021-04-16 ASSESSMENT — ENCOUNTER SYMPTOMS
TROUBLE SWALLOWING: 0
PHOTOPHOBIA: 0
VOICE CHANGE: 0
BACK PAIN: 1
BLOOD IN STOOL: 0
WHEEZING: 0
SORE THROAT: 0
ABDOMINAL DISTENTION: 1
ABDOMINAL PAIN: 1
CONSTIPATION: 0
VOMITING: 0
DIARRHEA: 0
COUGH: 0
NAUSEA: 0
SHORTNESS OF BREATH: 1
COLOR CHANGE: 0
ANAL BLEEDING: 0

## 2021-04-16 NOTE — PROGRESS NOTES
BARIATRIC SURGERY OFFICE NOTE    SUBJECTIVE:    Patient presenting today originally referred from Kyung Bauer MD, for   Chief Complaint   Patient presents with    Post-Op Check     consent appt.  sg    .    HPI: Alfonso George is a 54 y.o. female being seen for follow up for bariatric weight loss surgery. Afsaneh'slast month weight gain/loss was -11.2 Lbs. May the 3rd is her Sleeve gastrectomy, limiting Kcal and exercising, 20 min on the treadmill daily. Thoroughly reviewed the patient's medical history, family history, social history and review of systems with the patient today in theoffice. Please see medical record for pertinent positives. Past Medical History:   Diagnosis Date    Asthma     Hypertension       Past Surgical History:   Procedure Laterality Date    ENDOMETRIAL ABLATION N/A 07/2020    TONSILLECTOMY  1969    UPPER GASTROINTESTINAL ENDOSCOPY N/A 12/21/2020    EGD BIOPSY performed by Hayden Oneal MD at [address] [place] [address]       Current Outpatient Medications   Medication Sig Dispense Refill    budesonide-formoterol (SYMBICORT) 160-4.5 MCG/ACT AERO Inhale 2 puffs into the lungs 2 times daily 1 Inhaler 5    albuterol sulfate HFA (VENTOLIN HFA) 108 (90 Base) MCG/ACT inhaler Inhale 2 puffs into the lungs every 6 hours as needed for Wheezing      amLODIPine (NORVASC) 2.5 MG tablet TAKE 1 TABLET BY MOUTH ONE TIME A DAY      montelukast (SINGULAIR) 10 MG tablet TAKE 1 TABLET BY MOUTH AT BEDTIME      acetaminophen (AMINOFEN) 325 MG tablet Take 1 tablet by mouth every 4 hours as needed for Pain. 120 tablet 3     No current facility-administered medications for this visit. Allergies   Allergen Reactions    Bactrim [Sulfamethoxazole-Trimethoprim] Swelling     Throat swelled    Aspirin Rash    Penicillins Rash           Review of Systems   Constitutional: Positive for fatigue.  Negative for activity change, No respiratory distress. Breath sounds: No stridor. No wheezing or rales. Chest:      Chest wall: No tenderness. Abdominal:      General: Bowel sounds are normal. There is no distension. Palpations: Abdomen is soft. There is no mass. Tenderness: There is no abdominal tenderness. There is no guarding or rebound. Musculoskeletal: Normal range of motion. General: No tenderness. Lymphadenopathy:      Cervical: No cervical adenopathy. Skin:     General: Skin is warm and dry. Coloration: Skin is not pale. Findings: No erythema or rash. Neurological:      Mental Status: She is alert and oriented to person, place, and time. Cranial Nerves: No cranial nerve deficit. Coordination: Coordination normal.   Psychiatric:         Behavior: Behavior normal.         Thought Content: Thought content normal.         Judgment: Judgment normal.                 ASSESSMENT & PLAN:    1. Morbid obesity with BMI of 40.0-44.9, adult ([facility]         Sheri Eastonl next Tuesday, to start Slimfast diet and will proceed. Patient was encouraged to journal all food intake. Keep calorie level atapproximately 4764-8014. Protein intake is to be a minimum of 60-80 grams per day. Water drinking was encouraged with a goal of 64oz-128oz daily. Beverages to be calorie free except for milk. Every other beverage should bewater. They are to avoid soda. Continue to increase level of physical activity. I counseled the patient regarding diet and exercise, in preparation for her planned Robotic Sleeve Gastrectomy NAD Hiatal hernia repair. Counting calories, complying with the dietitian's recommendations, and complying with the preoperative workup including the dietitian counseling, exercise physiologist counseling,cardiologist evaluation and pre-operative optimization, pulmonologist evaluation and pre operative optimization, pre operative EGD evaluation.   The patient expressed understanding and willingness to comply nicely; allquestions and concerns addressed in details. Patient counseled on the risks, benefits, and alternatives oftreatment plan at length while in the office today. Patient states an understanding and willingness to proceed with the plan. Follow Up:  Return for Surgery, Bariatric follow up: diet, exercise & weight loss.       Cecy Zavala MD, FACS, FICS  Member of the [employer] of Metabolic and Bariatric Surgeons    [phone]    4/16/21

## 2021-04-19 RX ORDER — HEPARIN SODIUM 5000 [USP'U]/ML
5000 INJECTION, SOLUTION INTRAVENOUS; SUBCUTANEOUS ONCE
Status: CANCELLED | OUTPATIENT
Start: 2021-05-03

## 2021-04-19 RX ORDER — SODIUM CHLORIDE, SODIUM LACTATE, POTASSIUM CHLORIDE, CALCIUM CHLORIDE 600; 310; 30; 20 MG/100ML; MG/100ML; MG/100ML; MG/100ML
INJECTION, SOLUTION INTRAVENOUS ONCE
Status: CANCELLED | OUTPATIENT
Start: 2021-05-03

## 2021-04-20 ENCOUNTER — HOSPITAL ENCOUNTER (OUTPATIENT)
Age: 56
Discharge: HOME | End: 2021-04-20
Payer: COMMERCIAL

## 2021-04-20 ENCOUNTER — HOSPITAL ENCOUNTER (OUTPATIENT)
Dept: PREADMISSION TESTING | Age: 56
Discharge: HOME | End: 2021-04-24
Payer: COMMERCIAL

## 2021-04-20 ENCOUNTER — OFFICE VISIT (OUTPATIENT)
Dept: BARIATRICS/WEIGHT MGMT | Age: 56
End: 2021-04-20

## 2021-04-20 VITALS
TEMPERATURE: 97.5 F | SYSTOLIC BLOOD PRESSURE: 156 MMHG | HEART RATE: 116 BPM | OXYGEN SATURATION: 94 % | BODY MASS INDEX: 41.46 KG/M2 | RESPIRATION RATE: 18 BRPM | WEIGHT: 258 LBS | HEIGHT: 66 IN | DIASTOLIC BLOOD PRESSURE: 92 MMHG

## 2021-04-20 VITALS — WEIGHT: 258.8 LBS | BODY MASS INDEX: 41.59 KG/M2 | HEIGHT: 66 IN

## 2021-04-20 DIAGNOSIS — E66.01 MORBID OBESITY WITH BMI OF 40.0-44.9, ADULT (HCC): Primary | ICD-10-CM

## 2021-04-20 LAB
ALBUMIN SERPL-MCNC: 4.5 GM/DL (ref 3.4–5)
ALP BLD-CCNC: 97 IU/L (ref 40–128)
ALT SERPL-CCNC: 30 U/L (ref 10–40)
AMPHETAMINES: NEGATIVE
ANION GAP SERPL CALCULATED.3IONS-SCNC: 9 MMOL/L (ref 4–16)
AST SERPL-CCNC: 27 IU/L (ref 15–37)
BARBITURATE SCREEN URINE: NEGATIVE
BENZODIAZEPINE SCREEN, URINE: NEGATIVE
BILIRUB SERPL-MCNC: 0.6 MG/DL (ref 0–1)
BUN BLDV-MCNC: 9 MG/DL (ref 6–23)
CALCIUM SERPL-MCNC: 9.6 MG/DL (ref 8.3–10.6)
CANNABINOID SCREEN URINE: NEGATIVE
CHLORIDE BLD-SCNC: 103 MMOL/L (ref 99–110)
CO2: 27 MMOL/L (ref 21–32)
COCAINE METABOLITE: NEGATIVE
CREAT SERPL-MCNC: 0.6 MG/DL (ref 0.6–1.1)
EKG ATRIAL RATE: 102 BPM
EKG DIAGNOSIS: NORMAL
EKG P AXIS: 70 DEGREES
EKG P-R INTERVAL: 134 MS
EKG Q-T INTERVAL: 348 MS
EKG QRS DURATION: 86 MS
EKG QTC CALCULATION (BAZETT): 453 MS
EKG R AXIS: 20 DEGREES
EKG T AXIS: 50 DEGREES
EKG VENTRICULAR RATE: 102 BPM
ESTIMATED AVERAGE GLUCOSE: 120 MG/DL
GFR AFRICAN AMERICAN: >60 ML/MIN/1.73M2
GFR NON-AFRICAN AMERICAN: >60 ML/MIN/1.73M2
GLUCOSE BLD-MCNC: 97 MG/DL (ref 70–99)
HBA1C MFR BLD: 5.8 % (ref 4.2–6.3)
HCG QUALITATIVE: NEGATIVE
HCT VFR BLD CALC: 46.2 % (ref 37–47)
HEMOGLOBIN: 14.8 GM/DL (ref 12.5–16)
MCH RBC QN AUTO: 33.9 PG (ref 27–31)
MCHC RBC AUTO-ENTMCNC: 32 % (ref 32–36)
MCV RBC AUTO: 106 FL (ref 78–100)
OPIATES, URINE: NEGATIVE
OXYCODONE: NEGATIVE
PDW BLD-RTO: 12 % (ref 11.7–14.9)
PHENCYCLIDINE, URINE: NEGATIVE
PLATELET # BLD: 325 K/CU MM (ref 140–440)
PMV BLD AUTO: 9.4 FL (ref 7.5–11.1)
POTASSIUM SERPL-SCNC: 4.5 MMOL/L (ref 3.5–5.1)
RBC # BLD: 4.36 M/CU MM (ref 4.2–5.4)
SODIUM BLD-SCNC: 139 MMOL/L (ref 135–145)
TOTAL PROTEIN: 7.5 GM/DL (ref 6.4–8.2)
WBC # BLD: 6.2 K/CU MM (ref 4–10.5)

## 2021-04-20 PROCEDURE — 36415 COLL VENOUS BLD VENIPUNCTURE: CPT

## 2021-04-20 PROCEDURE — 80053 COMPREHEN METABOLIC PANEL: CPT

## 2021-04-20 PROCEDURE — 93005 ELECTROCARDIOGRAM TRACING: CPT | Performed by: ANESTHESIOLOGY

## 2021-04-20 PROCEDURE — 93010 ELECTROCARDIOGRAM REPORT: CPT | Performed by: INTERNAL MEDICINE

## 2021-04-20 PROCEDURE — 83036 HEMOGLOBIN GLYCOSYLATED A1C: CPT

## 2021-04-20 PROCEDURE — 85027 COMPLETE CBC AUTOMATED: CPT

## 2021-04-20 PROCEDURE — 80307 DRUG TEST PRSMV CHEM ANLYZR: CPT

## 2021-04-20 PROCEDURE — 99999 PR OFFICE/OUTPT VISIT,PROCEDURE ONLY: CPT

## 2021-04-20 PROCEDURE — 84703 CHORIONIC GONADOTROPIN ASSAY: CPT

## 2021-04-20 NOTE — PROGRESS NOTES
Outpatient Nutrition Counseling    REASON FOR VISIT: Pre-Op Diet Class    Chief Complaint:    Chief Complaint   Patient presents with    Weight Management       SUBJECTIVE:  Pt here for pre op diet class. Instructed on 2 week liquid liver shrinking diet and complete post-op diet progression including tips for N/V, fluid/activity logs, recipes and vitamins. Pt verbalized understanding to all info provided. The patient is a 54 y.o. female being seen for morbid obesity, considering weight loss surgery; Afsaneh's, Height: 5' 6\" (167.6 cm), Weight: 258 lb 12.8 oz (117.4 kg), Current Body mass index is 41.77 kg/m². The patient's PCP is Kylie Case MD     Comorbid Conditions:  Significant diseases affecting this patient are   Past Medical History:   Diagnosis Date    Asthma     last flare up Feb 2021\" follow with Dr Andres Birch Hypertension     \"hx - on medication since 2020    Irregular heartbeat     \"had to wear heart monitor for 3 days\"2020\"\"family dr did this- said heart rate alittle high otherwise was not concerned\"    Wears glasses     to read   . Review of Systems - Review of Systems  Otherwise per HPI. Allergies:   Allergies   Allergen Reactions    Bactrim [Sulfamethoxazole-Trimethoprim] Swelling     Throat swelled    Other      \"trouble with perfumes, fabric softners exc>>- throat starts itching- headache    Aspirin Rash    Penicillins Rash       Past Surgical History:  Past Surgical History:   Procedure Laterality Date    COLONOSCOPY  2020    ENDOMETRIAL ABLATION N/A 07/2020    TONSILLECTOMY  1969    UPPER GASTROINTESTINAL ENDOSCOPY N/A 12/21/2020    EGD BIOPSY performed by Slime Morejon MD at [address] [place] [address]    WISDOM TOOTH EXTRACTION         Family History:  Family History   Problem Relation Age of Onset    Cancer Mother         cervical    Dementia Mother     Asthma Mother     Hypertension Father     Diabetes Half-Brother        Social address nutritiondiagnosis:   Instructed on 600-800 kcal diet for weight loss post-op   Provided fluid/activity logs, recipe book and vitamins handout   Encouraged Physical activity as approved by physician    MONITORING/ EVALUATION/ PLAN:   Pt verbalized understanding of allmaterials covered   Pt asked pertinent questions throughout the session - expect compliance with nutrition guidelines presented   Provided pt with contact information should questions arise prior to next visit   Will f/u with pt post-op  KOLE Xiong MS, RDN, LD  4/20/2021

## 2021-04-26 DIAGNOSIS — Z01.818 PRE-OP TESTING: Primary | ICD-10-CM

## 2021-04-27 ENCOUNTER — HOSPITAL ENCOUNTER (OUTPATIENT)
Age: 56
Setting detail: SPECIMEN
Discharge: HOME | End: 2021-04-27
Payer: COMMERCIAL

## 2021-04-27 ENCOUNTER — NURSE ONLY (OUTPATIENT)
Dept: SURGERY | Age: 56
End: 2021-04-27
Payer: COMMERCIAL

## 2021-04-27 VITALS — DIASTOLIC BLOOD PRESSURE: 98 MMHG | TEMPERATURE: 98.2 F | SYSTOLIC BLOOD PRESSURE: 134 MMHG | HEART RATE: 82 BPM

## 2021-04-27 DIAGNOSIS — Z01.818 PRE-OP TESTING: Primary | ICD-10-CM

## 2021-04-27 PROCEDURE — U0005 INFEC AGEN DETEC AMPLI PROBE: HCPCS

## 2021-04-27 PROCEDURE — 99211 OFF/OP EST MAY X REQ PHY/QHP: CPT | Performed by: SURGERY

## 2021-04-27 PROCEDURE — U0003 INFECTIOUS AGENT DETECTION BY NUCLEIC ACID (DNA OR RNA); SEVERE ACUTE RESPIRATORY SYNDROME CORONAVIRUS 2 (SARS-COV-2) (CORONAVIRUS DISEASE [COVID-19]), AMPLIFIED PROBE TECHNIQUE, MAKING USE OF HIGH THROUGHPUT TECHNOLOGIES AS DESCRIBED BY CMS-2020-01-R: HCPCS

## 2021-04-27 NOTE — PROGRESS NOTES
Patient collected pre-op COVID-19 screening instruction and collection supplies given to patient accordingly. Patient denies fever/cough/sob or recent travels. Patient voiced understanding of collection/quarantine instructions. COVID screening lab ordered, collection completed without difficulty, identifiers placed on specimen. AVS given at discharge. Results will be given via mychart or telephone call [facility] will manage any positive test results, the procedure will be rescheduled at a later date).

## 2021-04-29 LAB
SARS-COV-2: NOT DETECTED
SOURCE: NORMAL

## 2021-04-30 ENCOUNTER — ANESTHESIA EVENT (OUTPATIENT)
Dept: OPERATING ROOM | Age: 56
DRG: 621 | End: 2021-04-30
Payer: COMMERCIAL

## 2021-04-30 ENCOUNTER — OFFICE VISIT (OUTPATIENT)
Dept: BARIATRICS/WEIGHT MGMT | Age: 56
End: 2021-04-30
Payer: COMMERCIAL

## 2021-04-30 VITALS
OXYGEN SATURATION: 97 % | SYSTOLIC BLOOD PRESSURE: 122 MMHG | DIASTOLIC BLOOD PRESSURE: 68 MMHG | HEART RATE: 114 BPM | TEMPERATURE: 98.6 F | WEIGHT: 250.2 LBS | BODY MASS INDEX: 40.21 KG/M2 | HEIGHT: 66 IN

## 2021-04-30 DIAGNOSIS — E66.01 MORBID OBESITY WITH BMI OF 40.0-44.9, ADULT (HCC): ICD-10-CM

## 2021-04-30 DIAGNOSIS — Z01.818 PRE-OP EVALUATION: Primary | ICD-10-CM

## 2021-04-30 PROCEDURE — 99214 OFFICE O/P EST MOD 30 MIN: CPT | Performed by: NURSE PRACTITIONER

## 2021-04-30 ASSESSMENT — ENCOUNTER SYMPTOMS
PHOTOPHOBIA: 0
WHEEZING: 0
CHEST TIGHTNESS: 0
APNEA: 0
SORE THROAT: 0
SHORTNESS OF BREATH: 0
NAUSEA: 0
BACK PAIN: 0
DIARRHEA: 0
COLOR CHANGE: 0

## 2021-04-30 NOTE — PROGRESS NOTES
[address] [facility]  1965  54 y.o. HPI    Annita Daniels is here for pre op weigh in.  lost -8.6 over 2 week slim fast diet and -23.5 overall. Current Body mass index is Body mass index is 40.38 kg/m². Clement Fly Protein shakes: 4 shakes per day most days. Got covid vaccine and got nauseous. Water: 60 oz daily  Labs: reviewed and wnl  Covid testing: negative 4/30/2021  New medications: denies  New health problems/medications: denies    SUBJECT MIRLANDE:    Chief Complaint   Patient presents with    Follow-up     f/u weigh in give presurg drink     Past Medical History:   Diagnosis Date    Asthma     last flare up Feb 2021\" follow with Dr Kang Currie Hypertension     \"hx - on medication since 2020    Irregular heartbeat     \"had to wear heart monitor for 3 days\"2020\"\"family dr did this- said heart rate alittle high otherwise was not concerned\"    Wears glasses     to read     Past Surgical History:   Procedure Laterality Date    COLONOSCOPY  2020    ENDOMETRIAL ABLATION N/A 07/2020    TONSILLECTOMY  1969    UPPER GASTROINTESTINAL ENDOSCOPY N/A 12/21/2020    EGD BIOPSY performed by Elio Meza MD at [address] [facility] EXTRACTION       Current Outpatient Medications   Medication Sig Dispense Refill    budesonide-formoterol (SYMBICORT) 160-4.5 MCG/ACT AERO Inhale 2 puffs into the lungs 2 times daily 1 Inhaler 5    albuterol sulfate HFA (VENTOLIN HFA) 108 (90 Base) MCG/ACT inhaler Inhale 2 puffs into the lungs every 6 hours as needed for Wheezing      amLODIPine (NORVASC) 2.5 MG tablet TAKE 1 TABLET BY MOUTH ONE TIME A DAY      montelukast (SINGULAIR) 10 MG tablet TAKE 1 TABLET BY MOUTH AT BEDTIME      acetaminophen (AMINOFEN) 325 MG tablet Take 1 tablet by mouth every 4 hours as needed for Pain. 120 tablet 3     No current facility-administered medications for this visit.       Family History   Problem Relation Age of Onset    Cancer Mother         cervical    Nose normal.      Mouth/Throat:      Mouth: Mucous membranes are moist.   Eyes:      Extraocular Movements: Extraocular movements intact. Pupils: Pupils are equal, round, and reactive to light. Neck:      Musculoskeletal: Normal range of motion and neck supple. Cardiovascular:      Rate and Rhythm: Normal rate and regular rhythm. Pulses: Normal pulses. Heart sounds: Normal heart sounds. Pulmonary:      Effort: Pulmonary effort is normal.      Breath sounds: Normal breath sounds. Abdominal:      General: Bowel sounds are normal.   Musculoskeletal: Normal range of motion. Skin:     General: Skin is warm and dry. Neurological:      General: No focal deficit present. Mental Status: She is alert and oriented to person, place, and time. Mental status is at baseline. Psychiatric:         Mood and Affect: Mood normal.         Behavior: Behavior normal.         ASSESSMENT/ PLAN:    1. Pre-op evaluation  - Scheduled for planned sleeve gastrectomy 5/3/2021.   - Did very well on 2 week Pre op diet, lost -8.6 lbs and -23.5 total.   - BMI 40.38  - Labs reviewed and all WNL. - Discussed surgery and post op course along with diet stages following surgery.   - MVI prescription given. - Pre surgical drink given and discussed  - Reviewed medications and no hormonal therapy or blood thinners  - Call with any questions or concerns. -  informed of above information. - patient reported allergy to hibiclens    2. Morbid obesity with BMI of 40.0-44.9, adult ([facility] [address]  - planned sleeve gastrectomy on 5/3/2021  - continue liver shrinking diet      No orders of the defined types were placed in this encounter. Return in about 4 days (around 5/4/2021) for surgery.     Renee Crawford, CNP

## 2021-04-30 NOTE — ANESTHESIA PRE PROCEDURE
throat starts itching- headache    Aspirin Rash    Penicillins Rash       Problem List:    Patient Active Problem List   Diagnosis Code    Cellulitis L03.90    Morbid obesity with BMI of 40.0-44.9, adult ([facility] E66.01, Z68.41    Essential hypertension I10    Mild persistent asthma without complication O98.71    GERD (gastroesophageal reflux disease) K21.9       Past Medical History:        Diagnosis Date    Asthma     last flare up Feb 2021\" follow with Dr Thea Fuller Hypertension     \"hx - on medication since 2020    Irregular heartbeat     \"had to wear heart monitor for 3 days\"2020\"\"family dr did this- said heart rate alittle high otherwise was not concerned\"    Wears glasses     to read       Past Surgical History:        Procedure Laterality Date    COLONOSCOPY  2020    ENDOMETRIAL ABLATION N/A 07/2020    TONSILLECTOMY  1969    UPPER GASTROINTESTINAL ENDOSCOPY N/A 12/21/2020    EGD BIOPSY performed by Avery Tillman MD at [address] [place] [address] EXTRACTION         Social History:    Social History     Tobacco Use    Smoking status: Never Smoker    Smokeless tobacco: Never Used   Substance Use Topics    Alcohol use: Yes     Alcohol/week: 7.0 - 14.0 standard drinks     Types: 7 - 14 Cans of beer per week     Drinks per session: 1 or 2     Comment: average\"2 times per week- last drank 4/20/2021- to stop for my surgery\"                                Counseling given: Not Answered      Vital Signs (Current): There were no vitals filed for this visit.                                            BP Readings from Last 3 Encounters:   04/30/21 122/68   04/27/21 (!) 134/98   04/20/21 (!) 156/92       NPO Status:                                                                                 BMI:   Wt Readings from Last 3 Encounters:   04/30/21 250 lb 3.2 oz (113.5 kg)   04/20/21 258 lb (117 kg)   04/20/21 258 lb 12.8 oz (117.4 kg)     There is no height or weight on file to calculate BMI.    CBC:   Lab Results   Component Value Date    WBC 6.2 04/20/2021    RBC 4.36 04/20/2021    HGB 14.8 04/20/2021    HCT 46.2 04/20/2021    .0 04/20/2021    RDW 12.0 04/20/2021     04/20/2021       CMP:   Lab Results   Component Value Date     04/20/2021    K 4.5 04/20/2021     04/20/2021    CO2 27 04/20/2021    BUN 9 04/20/2021    CREATININE 0.6 04/20/2021    GFRAA >60 04/20/2021    LABGLOM >60 04/20/2021    GLUCOSE 97 04/20/2021    PROT 7.5 04/20/2021    CALCIUM 9.6 04/20/2021    BILITOT 0.6 04/20/2021    ALKPHOS 97 04/20/2021    AST 27 04/20/2021    ALT 30 04/20/2021       POC Tests: No results for input(s): POCGLU, POCNA, POCK, POCCL, POCBUN, POCHEMO, POCHCT in the last 72 hours.     Coags: No results found for: PROTIME, INR, APTT    HCG (If Applicable):   Lab Results   Component Value Date    PREGTESTUR NEGATIVE 12/21/2020        ABGs: No results found for: PHART, PO2ART, YHS0RIT, PZK2WQN, BEART, Z0RDTWPP     Type & Screen (If Applicable):  No results found for: LABABO, LABRH    Drug/Infectious Status (If Applicable):  No results found for: HIV, HEPCAB    COVID-19 Screening (If Applicable):   Lab Results   Component Value Date    COVID19 NOT DETECTED 04/27/2021           Anesthesia Evaluation  Patient summary reviewed  Airway: Mallampati: I  TM distance: >3 FB   Neck ROM: full  Mouth opening: > = 3 FB Dental: normal exam         Pulmonary:normal exam    (+) asthma:                            Cardiovascular:  Exercise tolerance: good (>4 METS),   (+) hypertension:, dysrhythmias:,         Rhythm: regular  Rate: normal           Beta Blocker:  Not on Beta Blocker      ROS comment:   4/20/2021:  Sinus tachycardia   Otherwise normal ECG   No previous ECGs available   Confirmed by ADELE Abrams ([id number]) on 4/20/2021 7:07:21 PM      Neuro/Psych:   Negative Neuro/Psych ROS              GI/Hepatic/Renal:   (+) GERD:, morbid obesity          Endo/Other: Negative Endo/Other ROS                    Abdominal:           Vascular: negative vascular ROS. Anesthesia Plan      general     ASA 2       Induction: intravenous. MIPS: Postoperative opioids intended and Prophylactic antiemetics administered. Anesthetic plan and risks discussed with patient. JOSE ALBERTO Urena - EDUARDO   4/30/2021         Pre Anesthesia Assessment complete.  Chart reviewed on 4/30/2021

## 2021-05-03 ENCOUNTER — HOSPITAL ENCOUNTER (INPATIENT)
Age: 56
LOS: 1 days | Discharge: HOME | DRG: 621 | End: 2021-05-04
Attending: SURGERY | Admitting: SURGERY
Payer: COMMERCIAL

## 2021-05-03 ENCOUNTER — ANESTHESIA (OUTPATIENT)
Dept: OPERATING ROOM | Age: 56
DRG: 621 | End: 2021-05-03
Payer: COMMERCIAL

## 2021-05-03 VITALS
OXYGEN SATURATION: 100 % | SYSTOLIC BLOOD PRESSURE: 123 MMHG | RESPIRATION RATE: 2 BRPM | TEMPERATURE: 96.2 F | DIASTOLIC BLOOD PRESSURE: 88 MMHG

## 2021-05-03 DIAGNOSIS — E66.01 MORBID OBESITY (HCC): Primary | ICD-10-CM

## 2021-05-03 LAB — PREGNANCY TEST URINE, POC: NEGATIVE

## 2021-05-03 PROCEDURE — 6360000002 HC RX W HCPCS: Performed by: SURGERY

## 2021-05-03 PROCEDURE — 6370000000 HC RX 637 (ALT 250 FOR IP): Performed by: SURGERY

## 2021-05-03 PROCEDURE — 43281 LAP PARAESOPHAG HERN REPAIR: CPT | Performed by: SURGERY

## 2021-05-03 PROCEDURE — C1889 IMPLANT/INSERT DEVICE, NOC: HCPCS | Performed by: SURGERY

## 2021-05-03 PROCEDURE — 2500000003 HC RX 250 WO HCPCS: Performed by: SURGERY

## 2021-05-03 PROCEDURE — 2500000003 HC RX 250 WO HCPCS: Performed by: NURSE ANESTHETIST, CERTIFIED REGISTERED

## 2021-05-03 PROCEDURE — 2720000010 HC SURG SUPPLY STERILE: Performed by: SURGERY

## 2021-05-03 PROCEDURE — 1200000000 HC SEMI PRIVATE

## 2021-05-03 PROCEDURE — 6360000002 HC RX W HCPCS: Performed by: NURSE ANESTHETIST, CERTIFIED REGISTERED

## 2021-05-03 PROCEDURE — 43775 LAP SLEEVE GASTRECTOMY: CPT | Performed by: SURGERY

## 2021-05-03 PROCEDURE — 3700000001 HC ADD 15 MINUTES (ANESTHESIA): Performed by: SURGERY

## 2021-05-03 PROCEDURE — 94761 N-INVAS EAR/PLS OXIMETRY MLT: CPT

## 2021-05-03 PROCEDURE — 2580000003 HC RX 258: Performed by: SURGERY

## 2021-05-03 PROCEDURE — APPNB180 APP NON BILLABLE TIME > 60 MINS: Performed by: NURSE PRACTITIONER

## 2021-05-03 PROCEDURE — 2580000003 HC RX 258: Performed by: NURSE ANESTHETIST, CERTIFIED REGISTERED

## 2021-05-03 PROCEDURE — 43775 LAP SLEEVE GASTRECTOMY: CPT | Performed by: NURSE PRACTITIONER

## 2021-05-03 PROCEDURE — 0BQT0ZZ REPAIR DIAPHRAGM, OPEN APPROACH: ICD-10-PCS | Performed by: SURGERY

## 2021-05-03 PROCEDURE — 3600000009 HC SURGERY ROBOT BASE: Performed by: SURGERY

## 2021-05-03 PROCEDURE — C9113 INJ PANTOPRAZOLE SODIUM, VIA: HCPCS | Performed by: SURGERY

## 2021-05-03 PROCEDURE — 6360000002 HC RX W HCPCS: Performed by: ANESTHESIOLOGY

## 2021-05-03 PROCEDURE — 3700000000 HC ANESTHESIA ATTENDED CARE: Performed by: SURGERY

## 2021-05-03 PROCEDURE — 2709999900 HC NON-CHARGEABLE SUPPLY: Performed by: SURGERY

## 2021-05-03 PROCEDURE — 7100000001 HC PACU RECOVERY - ADDTL 15 MIN: Performed by: SURGERY

## 2021-05-03 PROCEDURE — 7100000000 HC PACU RECOVERY - FIRST 15 MIN: Performed by: SURGERY

## 2021-05-03 PROCEDURE — 88307 TISSUE EXAM BY PATHOLOGIST: CPT | Performed by: PATHOLOGY

## 2021-05-03 PROCEDURE — 43281 LAP PARAESOPHAG HERN REPAIR: CPT | Performed by: NURSE PRACTITIONER

## 2021-05-03 PROCEDURE — 0DB60Z3 EXCISION OF STOMACH, OPEN APPROACH, VERTICAL: ICD-10-PCS | Performed by: SURGERY

## 2021-05-03 PROCEDURE — 94640 AIRWAY INHALATION TREATMENT: CPT

## 2021-05-03 PROCEDURE — 3600000019 HC SURGERY ROBOT ADDTL 15MIN: Performed by: SURGERY

## 2021-05-03 PROCEDURE — 8E0W0CZ ROBOTIC ASSISTED PROCEDURE OF TRUNK REGION, OPEN APPROACH: ICD-10-PCS | Performed by: SURGERY

## 2021-05-03 PROCEDURE — 94664 DEMO&/EVAL PT USE INHALER: CPT

## 2021-05-03 PROCEDURE — 81025 URINE PREGNANCY TEST: CPT

## 2021-05-03 PROCEDURE — 94150 VITAL CAPACITY TEST: CPT

## 2021-05-03 PROCEDURE — 0DV40ZZ RESTRICTION OF ESOPHAGOGASTRIC JUNCTION, OPEN APPROACH: ICD-10-PCS | Performed by: SURGERY

## 2021-05-03 PROCEDURE — C9290 INJ, BUPIVACAINE LIPOSOME: HCPCS | Performed by: SURGERY

## 2021-05-03 PROCEDURE — S2900 ROBOTIC SURGICAL SYSTEM: HCPCS | Performed by: SURGERY

## 2021-05-03 RX ORDER — SCOLOPAMINE TRANSDERMAL SYSTEM 1 MG/1
1 PATCH, EXTENDED RELEASE TRANSDERMAL
Status: DISCONTINUED | OUTPATIENT
Start: 2021-05-03 | End: 2021-05-04 | Stop reason: HOSPADM

## 2021-05-03 RX ORDER — SENNA AND DOCUSATE SODIUM 50; 8.6 MG/1; MG/1
2 TABLET, FILM COATED ORAL 2 TIMES DAILY
Status: DISCONTINUED | OUTPATIENT
Start: 2021-05-03 | End: 2021-05-04 | Stop reason: HOSPADM

## 2021-05-03 RX ORDER — OXYCODONE HYDROCHLORIDE AND ACETAMINOPHEN 5; 325 MG/1; MG/1
2 TABLET ORAL EVERY 4 HOURS PRN
Status: DISCONTINUED | OUTPATIENT
Start: 2021-05-03 | End: 2021-05-04 | Stop reason: HOSPADM

## 2021-05-03 RX ORDER — FENTANYL CITRATE 50 UG/ML
25 INJECTION, SOLUTION INTRAMUSCULAR; INTRAVENOUS EVERY 5 MIN PRN
Status: DISCONTINUED | OUTPATIENT
Start: 2021-05-03 | End: 2021-05-03 | Stop reason: HOSPADM

## 2021-05-03 RX ORDER — POTASSIUM CHLORIDE 7.45 MG/ML
10 INJECTION INTRAVENOUS PRN
Status: DISCONTINUED | OUTPATIENT
Start: 2021-05-03 | End: 2021-05-04 | Stop reason: HOSPADM

## 2021-05-03 RX ORDER — PHENYLEPHRINE HCL IN 0.9% NACL 1 MG/10 ML
SYRINGE (ML) INTRAVENOUS PRN
Status: DISCONTINUED | OUTPATIENT
Start: 2021-05-03 | End: 2021-05-03 | Stop reason: SDUPTHER

## 2021-05-03 RX ORDER — HYDROMORPHONE HCL 110MG/55ML
PATIENT CONTROLLED ANALGESIA SYRINGE INTRAVENOUS PRN
Status: DISCONTINUED | OUTPATIENT
Start: 2021-05-03 | End: 2021-05-03 | Stop reason: SDUPTHER

## 2021-05-03 RX ORDER — PROCHLORPERAZINE EDISYLATE 5 MG/ML
10 INJECTION INTRAMUSCULAR; INTRAVENOUS EVERY 6 HOURS PRN
Status: DISCONTINUED | OUTPATIENT
Start: 2021-05-03 | End: 2021-05-04 | Stop reason: HOSPADM

## 2021-05-03 RX ORDER — PROPOFOL 10 MG/ML
INJECTION, EMULSION INTRAVENOUS PRN
Status: DISCONTINUED | OUTPATIENT
Start: 2021-05-03 | End: 2021-05-03 | Stop reason: SDUPTHER

## 2021-05-03 RX ORDER — DEXAMETHASONE SODIUM PHOSPHATE 4 MG/ML
INJECTION, SOLUTION INTRA-ARTICULAR; INTRALESIONAL; INTRAMUSCULAR; INTRAVENOUS; SOFT TISSUE PRN
Status: DISCONTINUED | OUTPATIENT
Start: 2021-05-03 | End: 2021-05-03 | Stop reason: SDUPTHER

## 2021-05-03 RX ORDER — BUDESONIDE AND FORMOTEROL FUMARATE DIHYDRATE 160; 4.5 UG/1; UG/1
2 AEROSOL RESPIRATORY (INHALATION) 2 TIMES DAILY
Status: DISCONTINUED | OUTPATIENT
Start: 2021-05-03 | End: 2021-05-04 | Stop reason: HOSPADM

## 2021-05-03 RX ORDER — SODIUM CHLORIDE, SODIUM LACTATE, POTASSIUM CHLORIDE, CALCIUM CHLORIDE 600; 310; 30; 20 MG/100ML; MG/100ML; MG/100ML; MG/100ML
INJECTION, SOLUTION INTRAVENOUS CONTINUOUS PRN
Status: DISCONTINUED | OUTPATIENT
Start: 2021-05-03 | End: 2021-05-03 | Stop reason: SDUPTHER

## 2021-05-03 RX ORDER — AMLODIPINE BESYLATE 5 MG/1
2.5 TABLET ORAL DAILY
Status: DISCONTINUED | OUTPATIENT
Start: 2021-05-03 | End: 2021-05-04 | Stop reason: HOSPADM

## 2021-05-03 RX ORDER — PANTOPRAZOLE SODIUM 40 MG/10ML
40 INJECTION, POWDER, LYOPHILIZED, FOR SOLUTION INTRAVENOUS 2 TIMES DAILY
Status: DISCONTINUED | OUTPATIENT
Start: 2021-05-03 | End: 2021-05-04 | Stop reason: HOSPADM

## 2021-05-03 RX ORDER — DEXTROSE, SODIUM CHLORIDE, AND POTASSIUM CHLORIDE 5; .45; .15 G/100ML; G/100ML; G/100ML
INJECTION INTRAVENOUS CONTINUOUS
Status: DISCONTINUED | OUTPATIENT
Start: 2021-05-03 | End: 2021-05-04 | Stop reason: HOSPADM

## 2021-05-03 RX ORDER — HYDROMORPHONE HCL 110MG/55ML
0.5 PATIENT CONTROLLED ANALGESIA SYRINGE INTRAVENOUS EVERY 5 MIN PRN
Status: DISCONTINUED | OUTPATIENT
Start: 2021-05-03 | End: 2021-05-03 | Stop reason: HOSPADM

## 2021-05-03 RX ORDER — SODIUM CHLORIDE 0.9 % (FLUSH) 0.9 %
10 SYRINGE (ML) INJECTION EVERY 12 HOURS SCHEDULED
Status: DISCONTINUED | OUTPATIENT
Start: 2021-05-03 | End: 2021-05-04 | Stop reason: HOSPADM

## 2021-05-03 RX ORDER — SODIUM CHLORIDE 0.9 % (FLUSH) 0.9 %
10 SYRINGE (ML) INJECTION PRN
Status: DISCONTINUED | OUTPATIENT
Start: 2021-05-03 | End: 2021-05-04 | Stop reason: HOSPADM

## 2021-05-03 RX ORDER — MAGNESIUM SULFATE 1 G/100ML
1000 INJECTION INTRAVENOUS PRN
Status: DISCONTINUED | OUTPATIENT
Start: 2021-05-03 | End: 2021-05-04 | Stop reason: HOSPADM

## 2021-05-03 RX ORDER — METOPROLOL TARTRATE 5 MG/5ML
INJECTION INTRAVENOUS PRN
Status: DISCONTINUED | OUTPATIENT
Start: 2021-05-03 | End: 2021-05-03 | Stop reason: SDUPTHER

## 2021-05-03 RX ORDER — HYDRALAZINE HYDROCHLORIDE 20 MG/ML
10 INJECTION INTRAMUSCULAR; INTRAVENOUS EVERY 6 HOURS PRN
Status: DISCONTINUED | OUTPATIENT
Start: 2021-05-03 | End: 2021-05-03 | Stop reason: CLARIF

## 2021-05-03 RX ORDER — HEPARIN SODIUM 5000 [USP'U]/ML
5000 INJECTION, SOLUTION INTRAVENOUS; SUBCUTANEOUS ONCE
Status: COMPLETED | OUTPATIENT
Start: 2021-05-03 | End: 2021-05-03

## 2021-05-03 RX ORDER — PROMETHAZINE HYDROCHLORIDE 25 MG/ML
12.5 INJECTION, SOLUTION INTRAMUSCULAR; INTRAVENOUS EVERY 6 HOURS PRN
Status: DISCONTINUED | OUTPATIENT
Start: 2021-05-03 | End: 2021-05-04 | Stop reason: HOSPADM

## 2021-05-03 RX ORDER — CEFAZOLIN SODIUM 2 G/50ML
2000 SOLUTION INTRAVENOUS EVERY 8 HOURS
Status: DISCONTINUED | OUTPATIENT
Start: 2021-05-03 | End: 2021-05-03

## 2021-05-03 RX ORDER — LIDOCAINE HYDROCHLORIDE 20 MG/ML
INJECTION, SOLUTION EPIDURAL; INFILTRATION; INTRACAUDAL; PERINEURAL PRN
Status: DISCONTINUED | OUTPATIENT
Start: 2021-05-03 | End: 2021-05-03 | Stop reason: SDUPTHER

## 2021-05-03 RX ORDER — HYDROMORPHONE HCL 110MG/55ML
0.25 PATIENT CONTROLLED ANALGESIA SYRINGE INTRAVENOUS EVERY 5 MIN PRN
Status: DISCONTINUED | OUTPATIENT
Start: 2021-05-03 | End: 2021-05-03 | Stop reason: HOSPADM

## 2021-05-03 RX ORDER — FENTANYL CITRATE 50 UG/ML
INJECTION, SOLUTION INTRAMUSCULAR; INTRAVENOUS PRN
Status: DISCONTINUED | OUTPATIENT
Start: 2021-05-03 | End: 2021-05-03 | Stop reason: SDUPTHER

## 2021-05-03 RX ORDER — ONDANSETRON 2 MG/ML
INJECTION INTRAMUSCULAR; INTRAVENOUS PRN
Status: DISCONTINUED | OUTPATIENT
Start: 2021-05-03 | End: 2021-05-03 | Stop reason: SDUPTHER

## 2021-05-03 RX ORDER — IPRATROPIUM BROMIDE AND ALBUTEROL SULFATE 2.5; .5 MG/3ML; MG/3ML
1 SOLUTION RESPIRATORY (INHALATION) EVERY 4 HOURS PRN
Status: DISCONTINUED | OUTPATIENT
Start: 2021-05-03 | End: 2021-05-04 | Stop reason: HOSPADM

## 2021-05-03 RX ORDER — SODIUM CHLORIDE, SODIUM LACTATE, POTASSIUM CHLORIDE, CALCIUM CHLORIDE 600; 310; 30; 20 MG/100ML; MG/100ML; MG/100ML; MG/100ML
INJECTION, SOLUTION INTRAVENOUS ONCE
Status: COMPLETED | OUTPATIENT
Start: 2021-05-03 | End: 2021-05-03

## 2021-05-03 RX ORDER — ROCURONIUM BROMIDE 10 MG/ML
INJECTION, SOLUTION INTRAVENOUS PRN
Status: DISCONTINUED | OUTPATIENT
Start: 2021-05-03 | End: 2021-05-03 | Stop reason: SDUPTHER

## 2021-05-03 RX ORDER — HYDRALAZINE HYDROCHLORIDE 20 MG/ML
5 INJECTION INTRAMUSCULAR; INTRAVENOUS EVERY 10 MIN PRN
Status: DISCONTINUED | OUTPATIENT
Start: 2021-05-03 | End: 2021-05-03 | Stop reason: HOSPADM

## 2021-05-03 RX ORDER — MORPHINE SULFATE 2 MG/ML
2 INJECTION, SOLUTION INTRAMUSCULAR; INTRAVENOUS EVERY 5 MIN PRN
Status: DISCONTINUED | OUTPATIENT
Start: 2021-05-03 | End: 2021-05-03 | Stop reason: HOSPADM

## 2021-05-03 RX ORDER — ONDANSETRON 2 MG/ML
4 INJECTION INTRAMUSCULAR; INTRAVENOUS EVERY 4 HOURS
Status: DISCONTINUED | OUTPATIENT
Start: 2021-05-03 | End: 2021-05-04 | Stop reason: HOSPADM

## 2021-05-03 RX ORDER — MONTELUKAST SODIUM 10 MG/1
10 TABLET ORAL NIGHTLY
Status: DISCONTINUED | OUTPATIENT
Start: 2021-05-03 | End: 2021-05-04 | Stop reason: HOSPADM

## 2021-05-03 RX ORDER — SODIUM CHLORIDE 9 MG/ML
25 INJECTION, SOLUTION INTRAVENOUS PRN
Status: DISCONTINUED | OUTPATIENT
Start: 2021-05-03 | End: 2021-05-04 | Stop reason: HOSPADM

## 2021-05-03 RX ORDER — LABETALOL HYDROCHLORIDE 5 MG/ML
5 INJECTION, SOLUTION INTRAVENOUS EVERY 10 MIN PRN
Status: DISCONTINUED | OUTPATIENT
Start: 2021-05-03 | End: 2021-05-03 | Stop reason: HOSPADM

## 2021-05-03 RX ORDER — PROMETHAZINE HYDROCHLORIDE 25 MG/ML
6.25 INJECTION, SOLUTION INTRAMUSCULAR; INTRAVENOUS
Status: COMPLETED | OUTPATIENT
Start: 2021-05-03 | End: 2021-05-03

## 2021-05-03 RX ADMIN — MONTELUKAST 10 MG: 10 TABLET, FILM COATED ORAL at 20:21

## 2021-05-03 RX ADMIN — SODIUM CHLORIDE, POTASSIUM CHLORIDE, SODIUM LACTATE AND CALCIUM CHLORIDE: 600; 310; 30; 20 INJECTION, SOLUTION INTRAVENOUS at 07:06

## 2021-05-03 RX ADMIN — Medication 100 MCG: at 08:01

## 2021-05-03 RX ADMIN — POTASSIUM CHLORIDE, DEXTROSE MONOHYDRATE AND SODIUM CHLORIDE: 150; 5; 450 INJECTION, SOLUTION INTRAVENOUS at 18:07

## 2021-05-03 RX ADMIN — LIDOCAINE HYDROCHLORIDE 100 MG: 20 INJECTION, SOLUTION EPIDURAL; INFILTRATION; INTRACAUDAL; PERINEURAL at 07:36

## 2021-05-03 RX ADMIN — ONDANSETRON 4 MG: 2 INJECTION INTRAMUSCULAR; INTRAVENOUS at 10:55

## 2021-05-03 RX ADMIN — PROMETHAZINE HYDROCHLORIDE 6.25 MG: 25 INJECTION INTRAMUSCULAR; INTRAVENOUS at 09:40

## 2021-05-03 RX ADMIN — SUGAMMADEX 200 MG: 100 INJECTION, SOLUTION INTRAVENOUS at 09:15

## 2021-05-03 RX ADMIN — Medication 100 MCG: at 08:00

## 2021-05-03 RX ADMIN — SODIUM CHLORIDE, PRESERVATIVE FREE 10 ML: 5 INJECTION INTRAVENOUS at 14:34

## 2021-05-03 RX ADMIN — HYDROMORPHONE HYDROCHLORIDE 1 MG: 1 INJECTION, SOLUTION INTRAMUSCULAR; INTRAVENOUS; SUBCUTANEOUS at 20:21

## 2021-05-03 RX ADMIN — CHLORHEXIDINE GLUCONATE: 4 LIQUID TOPICAL at 07:07

## 2021-05-03 RX ADMIN — HYDROMORPHONE HYDROCHLORIDE 1 MG: 1 INJECTION, SOLUTION INTRAMUSCULAR; INTRAVENOUS; SUBCUTANEOUS at 23:21

## 2021-05-03 RX ADMIN — DOCUSATE SODIUM 50 MG AND SENNOSIDES 8.6 MG 2 TABLET: 8.6; 5 TABLET, FILM COATED ORAL at 20:21

## 2021-05-03 RX ADMIN — SODIUM CHLORIDE, PRESERVATIVE FREE 10 ML: 5 INJECTION INTRAVENOUS at 20:21

## 2021-05-03 RX ADMIN — HYDROMORPHONE HYDROCHLORIDE 1 MG: 2 INJECTION INTRAMUSCULAR; INTRAVENOUS; SUBCUTANEOUS at 09:19

## 2021-05-03 RX ADMIN — SODIUM CHLORIDE, POTASSIUM CHLORIDE, SODIUM LACTATE AND CALCIUM CHLORIDE: 600; 310; 30; 20 INJECTION, SOLUTION INTRAVENOUS at 07:36

## 2021-05-03 RX ADMIN — PANTOPRAZOLE SODIUM 40 MG: 40 INJECTION, POWDER, FOR SOLUTION INTRAVENOUS at 20:21

## 2021-05-03 RX ADMIN — HYDROMORPHONE HYDROCHLORIDE 1 MG: 2 INJECTION INTRAMUSCULAR; INTRAVENOUS; SUBCUTANEOUS at 09:18

## 2021-05-03 RX ADMIN — HYDROMORPHONE HYDROCHLORIDE 1 MG: 1 INJECTION, SOLUTION INTRAMUSCULAR; INTRAVENOUS; SUBCUTANEOUS at 10:55

## 2021-05-03 RX ADMIN — ONDANSETRON 4 MG: 2 INJECTION INTRAMUSCULAR; INTRAVENOUS at 09:12

## 2021-05-03 RX ADMIN — ROCURONIUM BROMIDE 10 MG: 10 INJECTION INTRAVENOUS at 08:46

## 2021-05-03 RX ADMIN — BUDESONIDE AND FORMOTEROL FUMARATE DIHYDRATE 2 PUFF: 160; 4.5 AEROSOL RESPIRATORY (INHALATION) at 20:33

## 2021-05-03 RX ADMIN — HEPARIN SODIUM 5000 UNITS: 5000 INJECTION INTRAVENOUS; SUBCUTANEOUS at 07:06

## 2021-05-03 RX ADMIN — SODIUM CHLORIDE, POTASSIUM CHLORIDE, SODIUM LACTATE AND CALCIUM CHLORIDE: 600; 310; 30; 20 INJECTION, SOLUTION INTRAVENOUS at 08:19

## 2021-05-03 RX ADMIN — POTASSIUM CHLORIDE, DEXTROSE MONOHYDRATE AND SODIUM CHLORIDE: 150; 5; 450 INJECTION, SOLUTION INTRAVENOUS at 09:42

## 2021-05-03 RX ADMIN — FENTANYL CITRATE 100 MCG: 50 INJECTION, SOLUTION INTRAMUSCULAR; INTRAVENOUS at 07:36

## 2021-05-03 RX ADMIN — ONDANSETRON 4 MG: 2 INJECTION INTRAMUSCULAR; INTRAVENOUS at 23:18

## 2021-05-03 RX ADMIN — CLINDAMYCIN PHOSPHATE 600 MG: 150 INJECTION, SOLUTION INTRAVENOUS at 23:24

## 2021-05-03 RX ADMIN — Medication 100 MCG: at 07:58

## 2021-05-03 RX ADMIN — FENTANYL CITRATE 100 MCG: 50 INJECTION, SOLUTION INTRAMUSCULAR; INTRAVENOUS at 08:10

## 2021-05-03 RX ADMIN — CLINDAMYCIN PHOSPHATE 600 MG: 150 INJECTION, SOLUTION INTRAVENOUS at 16:22

## 2021-05-03 RX ADMIN — DEXAMETHASONE SODIUM PHOSPHATE 4 MG: 4 INJECTION, SOLUTION INTRAMUSCULAR; INTRAVENOUS at 07:53

## 2021-05-03 RX ADMIN — ROCURONIUM BROMIDE 60 MG: 10 INJECTION INTRAVENOUS at 07:36

## 2021-05-03 RX ADMIN — ONDANSETRON 4 MG: 2 INJECTION INTRAMUSCULAR; INTRAVENOUS at 14:33

## 2021-05-03 RX ADMIN — PROPOFOL 200 MG: 10 INJECTION, EMULSION INTRAVENOUS at 07:36

## 2021-05-03 RX ADMIN — Medication 100 MCG: at 07:59

## 2021-05-03 RX ADMIN — ONDANSETRON 4 MG: 2 INJECTION INTRAMUSCULAR; INTRAVENOUS at 18:59

## 2021-05-03 RX ADMIN — CLINDAMYCIN PHOSPHATE 600 MG: 150 INJECTION, SOLUTION INTRAVENOUS at 07:43

## 2021-05-03 RX ADMIN — OXYCODONE HYDROCHLORIDE AND ACETAMINOPHEN 2 TABLET: 5; 325 TABLET ORAL at 14:51

## 2021-05-03 RX ADMIN — METOPROLOL TARTRATE 10 MG: 5 INJECTION, SOLUTION INTRAVENOUS at 08:22

## 2021-05-03 RX ADMIN — HYDROMORPHONE HYDROCHLORIDE 1 MG: 1 INJECTION, SOLUTION INTRAMUSCULAR; INTRAVENOUS; SUBCUTANEOUS at 16:23

## 2021-05-03 RX ADMIN — DOCUSATE SODIUM 50 MG AND SENNOSIDES 8.6 MG 2 TABLET: 8.6; 5 TABLET, FILM COATED ORAL at 14:33

## 2021-05-03 RX ADMIN — ROCURONIUM BROMIDE 10 MG: 10 INJECTION INTRAVENOUS at 08:28

## 2021-05-03 RX ADMIN — PANTOPRAZOLE SODIUM 40 MG: 40 INJECTION, POWDER, FOR SOLUTION INTRAVENOUS at 14:33

## 2021-05-03 RX ADMIN — Medication 100 MCG: at 08:37

## 2021-05-03 ASSESSMENT — PULMONARY FUNCTION TESTS
PIF_VALUE: 19
PIF_VALUE: 26
PIF_VALUE: 29
PIF_VALUE: 23
PIF_VALUE: 22
PIF_VALUE: 25
PIF_VALUE: 28
PIF_VALUE: 26
PIF_VALUE: 27
PIF_VALUE: 25
PIF_VALUE: 28
PIF_VALUE: 27
PIF_VALUE: 27
PIF_VALUE: 20
PIF_VALUE: 27
PIF_VALUE: 27
PIF_VALUE: 28
PIF_VALUE: 25
PIF_VALUE: 26
PIF_VALUE: 20
PIF_VALUE: 27
PIF_VALUE: 23
PIF_VALUE: 26
PIF_VALUE: 17
PIF_VALUE: 17
PIF_VALUE: 21
PIF_VALUE: 27
PIF_VALUE: 28
PIF_VALUE: 17
PIF_VALUE: 2
PIF_VALUE: 20
PIF_VALUE: 24
PIF_VALUE: 28
PIF_VALUE: 27
PIF_VALUE: 26
PIF_VALUE: 2
PIF_VALUE: 21
PIF_VALUE: 1
PIF_VALUE: 28
PIF_VALUE: 28
PIF_VALUE: 26
PIF_VALUE: 27
PIF_VALUE: 25
PIF_VALUE: 17
PIF_VALUE: 27
PIF_VALUE: 28
PIF_VALUE: 28
PIF_VALUE: 26
PIF_VALUE: 20
PIF_VALUE: 26
PIF_VALUE: 1
PIF_VALUE: 27
PIF_VALUE: 28
PIF_VALUE: 28
PIF_VALUE: 26
PIF_VALUE: 26
PIF_VALUE: 24
PIF_VALUE: 28
PIF_VALUE: 26
PIF_VALUE: 26
PIF_VALUE: 20

## 2021-05-03 ASSESSMENT — PAIN DESCRIPTION - LOCATION
LOCATION: ABDOMEN
LOCATION: ABDOMEN

## 2021-05-03 ASSESSMENT — PAIN SCALES - GENERAL
PAINLEVEL_OUTOF10: 8
PAINLEVEL_OUTOF10: 7
PAINLEVEL_OUTOF10: 7
PAINLEVEL_OUTOF10: 4
PAINLEVEL_OUTOF10: 4
PAINLEVEL_OUTOF10: 8

## 2021-05-03 ASSESSMENT — PAIN DESCRIPTION - PAIN TYPE
TYPE: SURGICAL PAIN
TYPE: SURGICAL PAIN

## 2021-05-03 ASSESSMENT — PAIN - FUNCTIONAL ASSESSMENT
PAIN_FUNCTIONAL_ASSESSMENT: ACTIVITIES ARE NOT PREVENTED
PAIN_FUNCTIONAL_ASSESSMENT: 0-10

## 2021-05-03 ASSESSMENT — PAIN DESCRIPTION - ONSET
ONSET: ON-GOING
ONSET: AWAKENED FROM SLEEP

## 2021-05-03 ASSESSMENT — PAIN DESCRIPTION - ORIENTATION
ORIENTATION: UPPER
ORIENTATION: UPPER;RIGHT;MID

## 2021-05-03 ASSESSMENT — PAIN DESCRIPTION - PROGRESSION
CLINICAL_PROGRESSION: GRADUALLY WORSENING
CLINICAL_PROGRESSION: GRADUALLY WORSENING

## 2021-05-03 ASSESSMENT — PAIN DESCRIPTION - DESCRIPTORS: DESCRIPTORS: SHARP

## 2021-05-03 ASSESSMENT — PAIN DESCRIPTION - FREQUENCY: FREQUENCY: INTERMITTENT

## 2021-05-03 NOTE — CONSULTS
Hospitalist Consult Note      Name:  Francisco Bernstein /Age/Sex: 1965  (54 y.o. female)   MRN & CSN:  [id number] & [id number] Admission Date/Time: 5/3/2021  5:43 AM   Location:  [facility] PCP: Magali Lang MD       Hospital Day: 1    Assessment and Plan:   Francisco Bernstein is a 54 y.o.  female  who presents for elective bariatric surgery. Hospitalist Team consulted for: Medical Management     Morbid obesity  Status post laparoscopic sleeve gastrectomy 05/3/2021  Antiemetics, analgesics as needed  DVT prophylaxis per surgery  Encourage incentive spirometry  General surgery on board    Essential hypertension, poor control  May be related to pain, resume home medication  Treat pain adequately  Hydralazine as needed for systolic blood pressure greater than 160    Asthma, not in exacerbation  Bronchodilators as needed    Chronic medical condition  History of irregular heartbeat, not on any medications, monitor    Thank you for this consult, will follow with you  Diet DIET BARIATRIC CLEAR LIQUID;  DIET BARIATRIC FULL LIQUID;   DVT Prophylaxis [x] Lovenox, []  Heparin, [] SCDs, [] No VTE prophylaxis, patient ambulating   GI Prophylaxis [] PPI, [] H2 Blocker, [] No GI prophylaxis, patient is receiving diet/Tube Feeds   Code Status Full Code   Disposition Patient requires continued admission due to   MDM [] Low, [] Moderate,[]  High  Patient's risk as above due to      History of Present Illness:     Pt S&E. Seen with  at bedside    63-year-old female with past medical history of asthma, hypertension, presents for elective sleeve gastrectomy. Patient is seen after surgery, has some soreness after surgery, notes belching, has not passed gas or moved bowels. She denies any chest pain, shortness of breath, nausea, vomiting. She denies history of smoking, alcohol use or recreational drug use      10-14 point ROS reviewed negative, unless as noted above    Objective:        Intake/Output Summary (Last 24 Infusions:    dextrose 5% and 0.45% NaCl with KCl 20 mEq 125 mL/hr at 05/03/21 0942    sodium chloride       PRN Meds: ipratropium-albuterol, 1 ampule, Q4H PRN  HYDROmorphone, 1 mg, Q2H PRN  sodium chloride flush, 10 mL, PRN  sodium chloride, 25 mL, PRN  potassium chloride, 10 mEq, PRN  magnesium sulfate, 1,000 mg, PRN  oxyCODONE-acetaminophen, 2 tablet, Q4H PRN  promethazine, 12.5 mg, Q6H PRN  prochlorperazine, 10 mg, Q6H PRN  hydrALAZINE, 10 mg, Q6H PRN          Electronically signed by Suman Jeter MD on 5/3/2021 at 11:59 AM

## 2021-05-03 NOTE — PROGRESS NOTES
0930: Patient arrived to PACU from OR. Monitors applied, alarms on. VSS. Surgical sites are clean, dry and intact x5. Report obtained from [facility]: Patient states to feeling a little nauseous, patient medicated. 9239: Patient turned and repositioned in bed, tolerated well. 1005: Patients  Obdulio Cervantes updated in waiting room on patient status. Stated he has patients belongings out in car.  sent to pending room number [phone].   1014: Report called to Julian Birch for room [phone].   1026: Patient transferred to room [phone] by transport staff.

## 2021-05-03 NOTE — OP NOTE
OPERATIVE / PROCEDURE NOTE    Glynn Hunter, 1965, 54 y.o.,  female, CSN: [id number]  May 3, 2021       PREOPERATIVE DIAGNOSES:    Past Medical History:   Diagnosis Date    Asthma     last flare up Feb 2021\" follow with Dr Jaiden Villagran Hypertension     \"hx - on medication since 2020    Irregular heartbeat     \"had to wear heart monitor for 3 days\"2020\"\"family dr did this- said heart rate alittle high otherwise was not concerned\"    Wears glasses     to read   Body mass index is 39.22 kg/m². POSTOPERATIVE DIAGNOSES:   The same + Paraesophageal hiatal hernia. PPROCEDURE: Laparoscopic robotic DaVinci-assisted sleeve gastrectomy around a  38-[place] bougie + Paraesophageal Hiatal hernia primary repair with Bob fundoplication     Surgeon: Alayna Samayoa MD, FACS, FICS. Assistant: JOSE ALBERTO Hoang- CNP  The use of a first assistant was necessary for the proper positioning, prepping, and draping of the patient, intraoperative retraction, passing sutures and implants(like mesh), stapling bowel and vessels using  devises when necessary, and suction using laparoscopic instruments, exchanging DaVinci robotic instruments, passing sutures and closure of skin and subcutaneous tissues. ANESTHESIA: General endotracheal anesthesia as well as local to the wounds  preoperatively using 1% Xylocaine with epinephrine and  Exparel post operatively. ESTIMATED BLOOD LOSS: Less than 10 mL. SPECIMEN: Partial gastrectomy. DRAINS: None. COMPLICATIONS: None. CONDITION: Stable and extubated to the PACU.       OPERATIVE DESCRIPTION: After proper informed consent was signed by the  patient knowing all the risks, benefits, potential complications, and  possible alternatives of the procedure after she underwent very extensive  preoperative workup and evaluation and optimization after having seen the  dietician and followed with a [id number] restriction diet, the physical  therapist, and complied adequately with 3 times a week PT  exercises in addition to her routine daily activities and has been seen by  the psychologist, and after she underwent cardiac  clearance and pulmonology clearance and after she  adequately complied with dietary and lifestyle modification changes, the  patient was well prepped and committed to her lifestyle modifying and changing bariatric procedure in an  attempt to improve and/or eliminate some of her comorbidities. Preoperatively she was placed on a  liquid high-protein, low-carbohydrate Slim-Fast diet for the  last 2 weeks, and successfully lost -8.6 Lbs overall -23.5 Lbs. Her Body mass index is 39.22 kg/m². In the holding area, she received 3 mg of Ancef IV, 500 mg of Flagyl IV. TEDs  and SCDs were placed on her legs and activated bilaterally. Hibiclens skin  prep to the abdomen was performed. Then she was taken to the operating room and  was placed supine on the operating room table after institution of general  endotracheal anesthesia. The above-mentioned anesthetic 1% Xylocaine with epinephrine  was used to anesthetize all the incisions:  at the left lateral rectus level a 12-mm incision was performed. The Visiport was used to enter  the abdomen under direct visualization. Pneumoperitoneum was instituted with  C02 insufflation up to 15 mmHg pressure. The patient was then placed in steep reverse Trendelenburg   position, and the left subxiphoid 4-mm incision  was performed through which the [facility] liver retractor was placed. The  left lobe of the liver was retracted superiorly / cephalad and to the right  of the patient to expose the hiatus and hiatal hernia adequately. The  [place] table-mounted arm was stabilized.  All of  the ports were placed, an 8-mm left subcostal port in the left anterior  axillary line, mirrored image right subcostal port in the right anterior  axillary line, and a 12-mm right midclavicular port for the Ethicon stapler; all on a semilunar line.  After all the  ports were placed in, the [employer] robot was docked in after exploration   of the abdomen revealed no  contraindication to proceed with the planned procedures. The hiatal hernia was about 4 cm in greatest dimension. The pylorus was identified,   and 4 cm proximal to the pylorus, the gastrocolic omentum was taken down meticulously  using the robotic vessel sealer. Part of the right gastroepiploic vessels, the  left gastroepiploic and the short gastric vessels were taken down  meticulously using the DaVinci vessel sealer all the way to the angle of Hiss which  was done uneventfully without any bleeding encountered throughout the whole  procedure. Some posterior gastro-pancreatic attachments were identified and  divided sharply uneventfully, and then the OG tube placed to suction at the beginning  of the procedure until this point of the surgery was then removed  uneventfully and a 38-Bulgarian bougie was advanced per Anesthesia and guided  robotically all the way until it crossed the pylorus. The hiatal hernia was  then re-identified, the gastrohepatic omentum was dissected, the right mickey   was identified and the right aspect of the hernia sac was dissected and excised in its entirety, then the hiatal herniorrhaphy   was performed using 0 Ethibond in a figure-of-eight fashion x 2 with a primary repair, no mesh was needed, and then Bob fundoplication was performed with the 0- Ethibond, to proceed with its antireflux mechanism. An anterior repair was performed to avoid un-necessary posterior   dissection predisposing to post operative GERD given the high   risk of this procedure, and this was performed again, around a  38-[place]ian bougie uneventfully. The hiatal herniorrhaphy was performed with primary repair; by placing 2 figure-of-eight  0 Ethibond around the placed intra-esophageal 38-Bulgarian bougie. No mesh was needed.  And then [facility] anterior fundoplication was performed robotically using 0-Ethibond uneventfully. The Ethicon Endo-DILCIA 60-mm stapler  was then used to create the sleeve gastrectomy, by triple stapling on each side and dividing the stomach  starting 4 cm proximal to the pylorus, cephalad; starting with 1 green  loads followed by 6 blue staplers for a total of 7  firings all the way to the angle of Hiss around the 38-[place] bougie  uneventfully in a very even manner anteriorly and posteriorly, without any bleeding encountered  throughout the whole procedure, without any staple misfires, totally uneventfully. The suture line was then inverted and  oversewn and buttressed completely all the way starting at the angle of Hiss, distally  all the way to the pre-pyloric area using 3-0 Vicryl in a running continuous  fashion. One continuous suture was used. The staple line was then inspected  Meticulously and no abnormality was noted. No bleeding noted either. The  oversewn staple line was then sprayed with Tisseel -tissue sealant for an extra layer of  Protection against bleeding & / or leaks. The excluded stomach was then delivered through the left  supraumbilical port wound and was palpated for any gross abnormalities, and none  were noted. It was sent to permanent pathology. The Kristian-Sylvia fascia  closure device was then used to reapproximate the 12-mm ports and the 10-mm port with 0  Vicryl. All ports were removed under direct visualization without any  evidence of port site bleeding, including the Andrei liver retractor after the  pneumoperitoneum was evacuated. Further more all port sites were re-injected with the above mentioned local anesthetic mixture. The skin of all wounds were approximated using 4-0  Vicryl in a running subcuticular fashion followed by 2 layers of Dermabond skin glue.       The patient tolerated both procedures very well without any complications, was extubated in the OR, and was  sent to the PACU in stable condition.       ____________________________________________    Archana Bueno MD, FACS [facility]  5/3/2021  7:41 AM

## 2021-05-03 NOTE — H&P
HISTORY AND PHYSICAL EXAM    Date of Admission:  5/3/2021    PCP:  Marleni Belle MD    Chief Complaint / History of Present Illness:  Ayla Petit is a very pleasant 54 y.o. female who presents today for her bariatric procedure. As noted her There is no height or weight on file to calculate BMI. with significant co-morbidities as below. The patient has been complying with the pre-operative workup, lifestyle modifications and changes with the bariatric clinic, including:  Dietitian evaluation and counseling, psychologist evaluation and counseling, Exercise physiologist evaluation and counseling, cardiac preoperative clearance and optimization, pulmonology preoperative clearance and optimization, her preoperative EGD for GERD, revealed no Hiatal Hernia. she complied with the protein liquid diet for 2 weeks and successfully lost -8.6 over 2 week slim fast diet and -23.5 overall. Current Body mass index is Body mass index is 40.38 kg/m². Laila Rued Protein shakes: 4 shakes per day most days. Got covid vaccine and got nauseous. Water: 60 oz daily  Labs: reviewed and wnl  Covid testing: negative 4/30/2021  New medications: denies  New health problems/medications: denies Lbs; will proceed today with robotic laparoscopic sleeve gastrectomy. All risks and benefits, potential complications and possible alternatives discussed, and agreeable to proceed. PMH:   has a past medical history of Asthma, Hypertension, Irregular heartbeat, and Wears glasses. PSH:   has a past surgical history that includes Urethra dilation (1973); [place] tooth extraction; Endometrial ablation (N/A, 07/2020); Upper gastrointestinal endoscopy (N/A, 12/21/2020); Tonsillectomy (1969); and Colonoscopy (2020). Allergies:     Allergies   Allergen Reactions    Bactrim [Sulfamethoxazole-Trimethoprim] Swelling     Throat swelled    Hibiclens [Chlorhexidine Gluconate] Itching    Other      \"trouble with perfumes, fabric softners exc>>- throat starts itching- headache    Aspirin Rash    Penicillins Rash        Home Meds:    Prior to Admission medications    Medication Sig Start Date End Date Taking? Authorizing Provider   budesonide-formoterol (SYMBICORT) 160-4.5 MCG/ACT AERO Inhale 2 puffs into the lungs 2 times daily 11/23/20   James Mendoza MD   albuterol sulfate HFA (VENTOLIN HFA) 108 (90 Base) MCG/ACT inhaler Inhale 2 puffs into the lungs every 6 hours as needed for Wheezing    Historical Provider, MD   amLODIPine (NORVASC) 2.5 MG tablet TAKE 1 TABLET BY MOUTH ONE TIME A DAY 8/3/20   Historical Provider, MD   montelukast (SINGULAIR) 10 MG tablet TAKE 1 TABLET BY MOUTH AT BEDTIME 8/3/20   Historical Provider, MD   acetaminophen (AMINOFEN) 325 MG tablet Take 1 tablet by mouth every 4 hours as needed for Pain.  3/26/14   Kash Blackmon MD        Hospital Meds:    Current Facility-Administered Medications   Medication Dose Route Frequency Provider Last Rate Last Admin    chlorhexidine gluconate (ANTISEPTIC SKIN CLEANSER) 4 % solution   Topical Once Elio Meza MD        clindamycin (CLEOCIN) 600 mg in dextrose 5 % 50 mL IVPB  600 mg Intravenous Once Elio Meza MD        heparin (porcine) injection 5,000 Units  5,000 Units Subcutaneous Once Elio Meza MD        lactated ringers infusion   Intravenous Once Elio Meza MD          lactated ringers         Social History / Family History:        Social History     Socioeconomic History    Marital status:      Spouse name: Not on file    Number of children: Not on file    Years of education: Not on file    Highest education level: Not on file   Occupational History    Not on file   Social Needs    Financial resource strain: Not on file    Food insecurity     Worry: Not on file     Inability: Not on file    Transportation needs     Medical: Not on file     Non-medical: Not on file   Tobacco Use    Smoking status: Never Smoker    Smokeless tobacco: Never Used Substance and Sexual Activity    Alcohol use: Yes     Alcohol/week: 7.0 - 14.0 standard drinks     Types: 7 - 14 Cans of beer per week     Drinks per session: 1 or 2     Comment: average\"2 times per week- last drank 4/20/2021- to stop for my surgery\"    Drug use: No    Sexual activity: Not on file   Lifestyle    Physical activity     Days per week: Not on file     Minutes per session: Not on file    Stress: Not on file   Relationships    Social connections     Talks on phone: Not on file     Gets together: Not on file     Attends Voodoo service: Not on file     Active member of club or organization: Not on file     Attends meetings of clubs or organizations: Not on file     Relationship status: Not on file    Intimate partner violence     Fear of current or ex partner: Not on file     Emotionally abused: Not on file     Physically abused: Not on file     Forced sexual activity: Not on file   Other Topics Concern    Not on file   Social History Narrative    Not on file       Review of Systems:  Constitutional: Negative for fever, chills, diaphoresis, appetite change and fatigue. HENT: Negative for sore throat, trouble swallowing and voice change. Respiratory: Negative for cough, positive for shortness of breath no wheezing. Cardiovascular: Negative for chest pain positive for SOB with one flight of stairs exertion, no pitting LE edema. Gastrointestinal: Negative for nausea, vomiting, diarrhea, constipation, blood in stool, abdominal distention, anal bleeding or rectal pain. Musculoskeletal: Negative for joint swelling and arthralgias. Skin: Warm and dry, well perfused. Neurological: Negative for seizures, syncope, speech difficulty and weakness. Hematological/Lymphatic: Negative for adenopathy. No history of DVT/PE. Does not bruise/bleed easily. Psychiatric/Behavioral: Negative for agitation. Physical Exam:  Vital Signs: There were no vitals filed for this visit.   General appearance: Pt Alert and oriented, in no apparent acute distress. HEENT:  ALLYSON, EOMI, No JVDs, Bruits, Megalies. Lungs: Clear to auscultation bilaterally. Heart: Regular rate and rhythm, S1, S2 normal, no murmur, rub or gallop. Abdomen: Non tender. Non distended. Positive bowel sounds. No hernias noted, no masses palpable. Extremities: Warm, well perfused, no cyanosis or edema. Skin: Skin color, texture, turgor normal.  Neurologic: Grossly normal, Cranial nerves from II to XII intact. Radiologic / Imaging / TESTING  No results found. Labs:    No results found for this or any previous visit (from the past 24 hour(s)). Diagnosis:  Patient Active Problem List   Diagnosis    Cellulitis    Morbid obesity with BMI of 40.0-44.9, adult ([address]    Essential hypertension    Mild persistent asthma without complication    GERD (gastroesophageal reflux disease)           Assessment & Plan:    Clifford Mancuso is a very pleasant 54 y.o. female who presents today for her bariatric procedure. As noted her There is no height or weight on file to calculate BMI. with significant co-morbidities as below. The patient has been complying with the pre-operative workup, lifestyle modifications and changes with the bariatric clinic, including:  Dietitian evaluation and counseling, psychologist evaluation and counseling, Exercise physiologist evaluation and counseling, cardiac preoperative clearance and optimization, pulmonology preoperative clearance and optimization, her preoperative EGD for GERD, revealed no Hiatal Hernia. she complied with the protein liquid diet for 2 weeks and successfully  lost -8.6 over 2 week slim fast diet and -23.5 overall. Current Body mass index is Body mass index is 40.38 kg/m². Julian Gallardo Protein shakes: 4 shakes per day most days. Got covid vaccine and got nauseous.   Water: 60 oz daily  Labs: reviewed and wnl  Covid testing: negative 4/30/2021  New medications: denies  New health problems/medications: denies Lbs; will proceed today with robotic laparoscopic sleeve gastrectomy. All risks and benefits, potential complications and possible alternatives discussed, and agreeable to proceed.     ____________________________________________    Kyle Knutson MD, FACS, FICS    5/3/2021  6:21 AM

## 2021-05-04 ENCOUNTER — APPOINTMENT (OUTPATIENT)
Dept: GENERAL RADIOLOGY | Age: 56
DRG: 621 | End: 2021-05-04
Attending: SURGERY
Payer: COMMERCIAL

## 2021-05-04 VITALS
BODY MASS INDEX: 39.05 KG/M2 | TEMPERATURE: 97.6 F | DIASTOLIC BLOOD PRESSURE: 82 MMHG | SYSTOLIC BLOOD PRESSURE: 138 MMHG | HEIGHT: 66 IN | HEART RATE: 83 BPM | RESPIRATION RATE: 18 BRPM | OXYGEN SATURATION: 96 % | WEIGHT: 243 LBS

## 2021-05-04 LAB
ANION GAP SERPL CALCULATED.3IONS-SCNC: 6 MMOL/L (ref 4–16)
ANISOCYTOSIS: ABNORMAL
BANDED NEUTROPHILS ABSOLUTE COUNT: 0.19 K/CU MM
BANDED NEUTROPHILS RELATIVE PERCENT: 4 % (ref 5–11)
BUN BLDV-MCNC: 4 MG/DL (ref 6–23)
CALCIUM SERPL-MCNC: 9.4 MG/DL (ref 8.3–10.6)
CHLORIDE BLD-SCNC: 100 MMOL/L (ref 99–110)
CO2: 32 MMOL/L (ref 21–32)
CREAT SERPL-MCNC: 0.6 MG/DL (ref 0.6–1.1)
DIFFERENTIAL TYPE: ABNORMAL
GFR AFRICAN AMERICAN: >60 ML/MIN/1.73M2
GFR NON-AFRICAN AMERICAN: >60 ML/MIN/1.73M2
GLUCOSE BLD-MCNC: 139 MG/DL (ref 70–99)
HCT VFR BLD CALC: 40.1 % (ref 37–47)
HEMOGLOBIN: 13.2 GM/DL (ref 12.5–16)
LYMPHOCYTES ABSOLUTE: 1.8 K/CU MM
LYMPHOCYTES RELATIVE PERCENT: 38 % (ref 24–44)
MCH RBC QN AUTO: 34.5 PG (ref 27–31)
MCHC RBC AUTO-ENTMCNC: 32.9 % (ref 32–36)
MCV RBC AUTO: 104.7 FL (ref 78–100)
MONOCYTES ABSOLUTE: 0.5 K/CU MM
MONOCYTES RELATIVE PERCENT: 10 % (ref 0–4)
PDW BLD-RTO: 12 % (ref 11.7–14.9)
PLATELET # BLD: 213 K/CU MM (ref 140–440)
PMV BLD AUTO: 9.6 FL (ref 7.5–11.1)
POTASSIUM SERPL-SCNC: 4.7 MMOL/L (ref 3.5–5.1)
RBC # BLD: 3.83 M/CU MM (ref 4.2–5.4)
SEGMENTED NEUTROPHILS ABSOLUTE COUNT: 2.3 K/CU MM
SEGMENTED NEUTROPHILS RELATIVE PERCENT: 48 % (ref 36–66)
SODIUM BLD-SCNC: 138 MMOL/L (ref 135–145)
WBC # BLD: 4.8 K/CU MM (ref 4–10.5)

## 2021-05-04 PROCEDURE — 6360000002 HC RX W HCPCS: Performed by: SURGERY

## 2021-05-04 PROCEDURE — 94761 N-INVAS EAR/PLS OXIMETRY MLT: CPT

## 2021-05-04 PROCEDURE — 6370000000 HC RX 637 (ALT 250 FOR IP): Performed by: SURGERY

## 2021-05-04 PROCEDURE — 6360000004 HC RX CONTRAST MEDICATION: Performed by: SURGERY

## 2021-05-04 PROCEDURE — 85027 COMPLETE CBC AUTOMATED: CPT

## 2021-05-04 PROCEDURE — 2580000003 HC RX 258: Performed by: SURGERY

## 2021-05-04 PROCEDURE — 2500000003 HC RX 250 WO HCPCS: Performed by: SURGERY

## 2021-05-04 PROCEDURE — 94150 VITAL CAPACITY TEST: CPT

## 2021-05-04 PROCEDURE — 80048 BASIC METABOLIC PNL TOTAL CA: CPT

## 2021-05-04 PROCEDURE — APPNB30 APP NON BILLABLE TIME 0-30 MINS: Performed by: NURSE PRACTITIONER

## 2021-05-04 PROCEDURE — 99024 POSTOP FOLLOW-UP VISIT: CPT | Performed by: SURGERY

## 2021-05-04 PROCEDURE — C9113 INJ PANTOPRAZOLE SODIUM, VIA: HCPCS | Performed by: SURGERY

## 2021-05-04 PROCEDURE — 36415 COLL VENOUS BLD VENIPUNCTURE: CPT

## 2021-05-04 PROCEDURE — 99024 POSTOP FOLLOW-UP VISIT: CPT | Performed by: NURSE PRACTITIONER

## 2021-05-04 PROCEDURE — 94640 AIRWAY INHALATION TREATMENT: CPT

## 2021-05-04 PROCEDURE — 74240 X-RAY XM UPR GI TRC 1CNTRST: CPT

## 2021-05-04 PROCEDURE — 85007 BL SMEAR W/DIFF WBC COUNT: CPT

## 2021-05-04 RX ORDER — PANTOPRAZOLE SODIUM 40 MG/1
40 TABLET, DELAYED RELEASE ORAL 2 TIMES DAILY
Qty: 60 TABLET | Refills: 3 | Status: SHIPPED | OUTPATIENT
Start: 2021-05-04

## 2021-05-04 RX ORDER — HYDROMORPHONE HYDROCHLORIDE 2 MG/1
2 TABLET ORAL EVERY 12 HOURS PRN
Qty: 15 TABLET | Refills: 0 | Status: SHIPPED | OUTPATIENT
Start: 2021-05-04 | End: 2021-05-11

## 2021-05-04 RX ORDER — ONDANSETRON 4 MG/1
4 TABLET, ORALLY DISINTEGRATING ORAL EVERY 4 HOURS PRN
Qty: 30 TABLET | Refills: 3 | Status: SHIPPED | OUTPATIENT
Start: 2021-05-04

## 2021-05-04 RX ORDER — AMOXICILLIN 250 MG
2 CAPSULE ORAL DAILY
Qty: 60 TABLET | Refills: 3 | Status: SHIPPED | OUTPATIENT
Start: 2021-05-04 | End: 2021-05-14

## 2021-05-04 RX ADMIN — PANTOPRAZOLE SODIUM 40 MG: 40 INJECTION, POWDER, FOR SOLUTION INTRAVENOUS at 09:09

## 2021-05-04 RX ADMIN — POTASSIUM CHLORIDE, DEXTROSE MONOHYDRATE AND SODIUM CHLORIDE: 150; 5; 450 INJECTION, SOLUTION INTRAVENOUS at 10:53

## 2021-05-04 RX ADMIN — POTASSIUM CHLORIDE, DEXTROSE MONOHYDRATE AND SODIUM CHLORIDE: 150; 5; 450 INJECTION, SOLUTION INTRAVENOUS at 02:33

## 2021-05-04 RX ADMIN — OXYCODONE HYDROCHLORIDE AND ACETAMINOPHEN 2 TABLET: 5; 325 TABLET ORAL at 09:09

## 2021-05-04 RX ADMIN — BUDESONIDE AND FORMOTEROL FUMARATE DIHYDRATE 2 PUFF: 160; 4.5 AEROSOL RESPIRATORY (INHALATION) at 08:53

## 2021-05-04 RX ADMIN — AMLODIPINE BESYLATE 2.5 MG: 5 TABLET ORAL at 09:09

## 2021-05-04 RX ADMIN — HYDROMORPHONE HYDROCHLORIDE 1 MG: 1 INJECTION, SOLUTION INTRAMUSCULAR; INTRAVENOUS; SUBCUTANEOUS at 06:43

## 2021-05-04 RX ADMIN — ONDANSETRON 4 MG: 2 INJECTION INTRAMUSCULAR; INTRAVENOUS at 06:43

## 2021-05-04 RX ADMIN — HYDROMORPHONE HYDROCHLORIDE 1 MG: 1 INJECTION, SOLUTION INTRAMUSCULAR; INTRAVENOUS; SUBCUTANEOUS at 02:46

## 2021-05-04 RX ADMIN — ONDANSETRON 4 MG: 2 INJECTION INTRAMUSCULAR; INTRAVENOUS at 10:53

## 2021-05-04 RX ADMIN — DOCUSATE SODIUM 50 MG AND SENNOSIDES 8.6 MG 2 TABLET: 8.6; 5 TABLET, FILM COATED ORAL at 09:09

## 2021-05-04 RX ADMIN — ONDANSETRON 4 MG: 2 INJECTION INTRAMUSCULAR; INTRAVENOUS at 15:02

## 2021-05-04 RX ADMIN — SODIUM CHLORIDE, PRESERVATIVE FREE 10 ML: 5 INJECTION INTRAVENOUS at 09:09

## 2021-05-04 RX ADMIN — ENOXAPARIN SODIUM 40 MG: 40 INJECTION SUBCUTANEOUS at 09:09

## 2021-05-04 RX ADMIN — ONDANSETRON 4 MG: 2 INJECTION INTRAMUSCULAR; INTRAVENOUS at 02:46

## 2021-05-04 RX ADMIN — DIATRIZOATE MEGLUMINE AND DIATRIZOATE SODIUM 60 ML: 600; 100 SOLUTION ORAL; RECTAL at 08:02

## 2021-05-04 RX ADMIN — HYDROMORPHONE HYDROCHLORIDE 1 MG: 1 INJECTION, SOLUTION INTRAMUSCULAR; INTRAVENOUS; SUBCUTANEOUS at 10:53

## 2021-05-04 ASSESSMENT — PAIN DESCRIPTION - PAIN TYPE
TYPE: SURGICAL PAIN
TYPE: SURGICAL PAIN

## 2021-05-04 ASSESSMENT — PAIN DESCRIPTION - PROGRESSION
CLINICAL_PROGRESSION: NOT CHANGED
CLINICAL_PROGRESSION: NOT CHANGED

## 2021-05-04 ASSESSMENT — PAIN DESCRIPTION - FREQUENCY
FREQUENCY: INTERMITTENT
FREQUENCY: CONTINUOUS

## 2021-05-04 ASSESSMENT — PAIN DESCRIPTION - LOCATION: LOCATION: ABDOMEN

## 2021-05-04 ASSESSMENT — PAIN DESCRIPTION - DESCRIPTORS: DESCRIPTORS: SHARP

## 2021-05-04 ASSESSMENT — PAIN SCALES - GENERAL
PAINLEVEL_OUTOF10: 7
PAINLEVEL_OUTOF10: 7

## 2021-05-04 NOTE — PLAN OF CARE
Problem: Falls - Risk of:  Goal: Will remain free from falls  Description: Will remain free from falls  5/4/2021 1647 by Miguel Ross. Quin Pedersen RN  Outcome: Completed  5/4/2021 1148 by Guera Garcia RN  Outcome: Ongoing  Goal: Absence of physical injury  Description: Absence of physical injury  5/4/2021 1647 by Miguel Ross. Quin Pedersen RN  Outcome: Completed  5/4/2021 1148 by Guera Garcia RN  Outcome: Ongoing     Problem: Pain:  Goal: Pain level will decrease  Description: Pain level will decrease  5/4/2021 1647 by Miguel Ross. Quin Pedersen RN  Outcome: Completed  5/4/2021 1148 by Guera Garcia RN  Outcome: Ongoing  Goal: Control of acute pain  Description: Control of acute pain  5/4/2021 1647 by Miguel Ross. Quin Pedersen RN  Outcome: Completed  5/4/2021 1148 by Guera Garcia RN  Outcome: Ongoing  Goal: Control of chronic pain  Description: Control of chronic pain  5/4/2021 1647 by Miguel Ross.  Quin Pedersen RN  Outcome: Completed  5/4/2021 1148 by Guera Garcia RN  Outcome: Ongoing done

## 2021-05-04 NOTE — DISCHARGE SUMMARY
Physician Discharge Summary     Patient ID:  Abbey Lam  [id number]  2 y.o.  1965    Admit date: 5/3/2021    Discharge date: 5/4/2021     Admitting Physician: Avery Tillman MD     Admission Diagnoses: Morbid obesity ([facility] [E66.01]  Patient Active Problem List   Diagnosis    Cellulitis    Morbid obesity with BMI of 40.0-44.9, adult ([facility]    Essential hypertension    Mild persistent asthma without complication    GERD (gastroesophageal reflux disease)    Morbid obesity ([facility]     Past Medical History:   Diagnosis Date    Asthma     last flare up Feb 2021\" follow with Dr Thea Fuller Hypertension     \"hx - on medication since 2020    Irregular heartbeat     \"had to wear heart monitor for 3 days\"2020\"\"family dr did this- said heart rate alittle high otherwise was not concerned\"    Wears glasses     to read       Discharge Diagnoses: same    Admission Condition: [address] Discharged Condition: Good. Indication for Admission: Elective bariatric surgery. Hospital Course: Patient had an uneventful hospital course after her bariatric procedure that went uneventfully: laparoscopic robotic assisted Sleeve gastrectomy, and was tolerating bariatric stage II diet and ambulating without difficulty at the time of discharge. Consults: Hospitalist / PCP. Treatments: IV hydration, antibiotics: Ancef and metronidazole, analgesia: acetaminophen w/ codeine and Dilaudid, anticoagulation: LMW heparin, respiratory therapy: O2 and incentive spirometry and surgery: laparoscopic robotic assisted Sleeve gastrectomy.     Discharge Exam:  /82   Pulse 83   Temp 97.6 °F (36.4 °C) (Oral)   Resp 18   Ht 5' 6\" (1.676 m)   Wt 243 lb (110.2 kg)   SpO2 96%   BMI 39.22 kg/m²     General Appearance:    Alert, cooperative, no distress, appears stated age   Head:    Normocephalic, without obvious abnormality, atraumatic   Eyes:    PERRL, conjunctiva/corneas clear, EOM's every 12 hours as needed for Pain for up to 7 days. Qty: 15 tablet, Refills: 0    Comments: Reduce doses taken as pain becomes manageable  Associated Diagnoses: Morbid obesity (HCC)      ondansetron (ZOFRAN ODT) 4 MG disintegrating tablet Take 1 tablet by mouth every 4 hours as needed for Nausea or Vomiting  Qty: 30 tablet, Refills: 3      senna-docusate (SENOKOT S) 8.6-50 MG per tablet Take 2 tablets by mouth daily for 10 days  Qty: 60 tablet, Refills: 3      pantoprazole (PROTONIX) 40 MG tablet Take 1 tablet by mouth 2 times daily  Qty: 60 tablet, Refills: 3         CONTINUE these medications which have NOT CHANGED    Details   budesonide-formoterol (SYMBICORT) 160-4.5 MCG/ACT AERO Inhale 2 puffs into the lungs 2 times daily  Qty: 1 Inhaler, Refills: 5    Associated Diagnoses: Mild persistent asthma without complication      amLODIPine (NORVASC) 2.5 MG tablet TAKE 1 TABLET BY MOUTH ONE TIME A DAY      montelukast (SINGULAIR) 10 MG tablet TAKE 1 TABLET BY MOUTH AT BEDTIME      acetaminophen (AMINOFEN) 325 MG tablet Take 1 tablet by mouth every 4 hours as needed for Pain. Qty: 120 tablet, Refills: 3      albuterol sulfate HFA (VENTOLIN HFA) 108 (90 Base) MCG/ACT inhaler Inhale 2 puffs into the lungs every 6 hours as needed for Wheezing           Activity: no lifting > 20 Lbs, or Strenuous exercise for 3 weeks. Diet: encourage fluids and bariatric stage II diet for 2 wks.   Wound Care: keep wound clean and dry    Call  [phone] to make a follow-up appointment  with Dr Erma Mckeon MD, [facility] in 1 week.    ____________________________________________    Signed:      Erma Mckeon MD, [facility], [facility]    5/4/2021  12:47 PM

## 2021-05-04 NOTE — PROGRESS NOTES
BARIATRIC SURGERY PROGRESS NOTE    HPI: Rosalind Johnson is a 55 yo female POD #1 s/p robot assisted sleeve gastrectomy. Doing well this am. Has some nausea but no vomiting. Pain controlled. Has been up walking the hallways and using IS                Vitals:    05/03/21 1619 05/03/21 2012 05/04/21 0015 05/04/21 0425   BP: (!) 152/85 137/79 (!) 141/85 139/79   Pulse: 87 81 75 80   Resp: 16 18 17 18   Temp: 97.6 °F (36.4 °C) 97.8 °F (36.6 °C) 97.9 °F (36.6 °C) 97.9 °F (36.6 °C)   TempSrc: Oral Oral Oral Oral   SpO2: 93%      Weight:       Height:         I/O last 3 completed shifts: In: 3506.3 [P.O.:480; I.V.:2976.3; IV Piggyback:50]  Out: 8952 [Urine:3350; Blood:10]  No intake/output data recorded. DIET BARIATRIC FULL LIQUID;    Recent Results (from the past 48 hour(s))   POC Pregnancy Urine Qual    Collection Time: 05/03/21  7:03 AM   Result Value Ref Range    Preg Test, Ur NEGATIVE NEGATIVE   CBC auto differential    Collection Time: 05/04/21  7:04 AM   Result Value Ref Range    WBC 4.8 4.0 - 10.5 K/CU MM    RBC 3.83 (L) 4.2 - 5.4 M/CU MM    Hemoglobin 13.2 12.5 - 16.0 GM/DL    Hematocrit 40.1 37 - 47 %    .7 (H) 78 - 100 FL    MCH 34.5 (H) 27 - 31 PG    MCHC 32.9 32.0 - 36.0 %    RDW 12.0 11.7 - 14.9 %    Platelets 024 812 - 556 K/CU MM    MPV 9.6 7.5 - 11.1 FL       Scheduled Meds:   amLODIPine  2.5 mg Oral Daily    budesonide-formoterol  2 puff Inhalation BID    montelukast  10 mg Oral Nightly    sodium chloride flush  10 mL Intravenous 2 times per day    enoxaparin  40 mg Subcutaneous Daily    pantoprazole  40 mg Intravenous BID    scopolamine  1 patch Transdermal Q72H    sennosides-docusate sodium  2 tablet Oral BID    ondansetron  4 mg Intravenous Q4H     Continuous Infusions:   dextrose 5% and 0.45% NaCl with KCl 20 mEq 125 mL/hr at 05/04/21 0233    sodium chloride         Physical Exam:  HEENT: Anicteric sclerae, Oropharyngeal mucosae moist, pink and intact.   Heart:  Normal S1 and S2, RRR  Lungs: Clear to auscultation bilaterally, No audible Wheezes or Rales. Extremities: No edema. Neuro: Alert and Oriented x 3, Non focal.  Abdomen: Soft, appropriately tender, Non distended, Positive bowel sounds. Incision: Nicely healing: No erythema, No discharge, glue intact      Active Problems: Morbid obesity ([address]  Resolved Problems:    * No resolved hospital problems. *      Assessment and Plan:  Annita Daniels is a 54 y.o. female who is POD # 1 status post robot-assisted sleeve gastrectomy.    -Pain and nausea under adequate control.  -Labs pending. -UGI pending will await results  -Will advance to bariatric fulls  -Increase Ambulation to at least 4x/day walk in the hallways with assistance.  -Respiratory rachelle-operative care: encourage incentive Spirometry / deep breathing and coughing 10x/hr while awake.     -Continue DVT prophylaxis with Teds and SCDs and SC Lovenox.  -Continue GI prophylaxis with Protonix IV till able to tolerate [id number] [address]    5/4/2021  8:11 AM  ___________________________________________

## 2021-05-04 NOTE — ANESTHESIA POSTPROCEDURE EVALUATION
Department of Anesthesiology  Postprocedure Note    Patient: Ayla Petit  MRN: [id number]  YOB: 1965  Date of evaluation: 5/4/2021  Time:  6:31 AM     Procedure Summary     Date: 05/03/21 Room / Location: [address] [place] [address]    Anesthesia Start: 9611 Anesthesia Stop: 2705    Procedure: GASTRECTOMY SLEEVE LAPAROSCOPIC ROBOTIC, HIATAL HERNIA REPAIR (N/A Abdomen) Diagnosis: (MORBID OBESITY)    Surgeons: Kash Lopez MD Responsible Provider: Dre Estrada MD    Anesthesia Type: general ASA Status: 2          Anesthesia Type: general    Eusebio Phase I: Eusebio Score: 9    Eusebio Phase II:      Last vitals: Reviewed and per EMR flowsheets.        Anesthesia Post Evaluation    Patient location during evaluation: PACU  Patient participation: complete - patient participated  Level of consciousness: awake and alert  Pain score: 1  Airway patency: patent  Nausea & Vomiting: no nausea, no vomiting and nausea  Complications: no  Cardiovascular status: blood pressure returned to baseline  Respiratory status: acceptable  Hydration status: euvolemic

## 2021-05-04 NOTE — PROGRESS NOTES
Exam:   GEN Awake female, sitting upright in bed in no apparent distress. Appears given age. EYES Pupils are equally round. No scleral erythema, discharge, or conjunctivitis. HENT Mucous membranes are moist. Oral pharynx without exudates, no evidence of thrush. NECK Supple, no apparent thyromegaly or masses. RESP Clear to auscultation, no wheezes, rales or rhonchi. Symmetric chest movement while on room air. CARDIO/VASC S1/S2 auscultated. Regular rate without appreciable murmurs, rubs, or gallops. No JVD or carotid bruits. Peripheral pulses equal bilaterally and palpable. No peripheral edema. GI Abdomen is soft without significant tenderness, masses, or guarding. Reduced bowel sounds, healing laparoscopic scars, and clean Dermabond.  No costovertebral angle tenderness. Normal appearing external genitalia. Sanchez catheter is not present. SKIN Normal coloration, warm, dry. NEURO Cranial nerves appear grossly intact, normal speech, no lateralizing weakness. PSYCH Awake, alert, oriented x 4. Affect appropriate.     Medications:   Medications:    amLODIPine  2.5 mg Oral Daily    budesonide-formoterol  2 puff Inhalation BID    montelukast  10 mg Oral Nightly    sodium chloride flush  10 mL Intravenous 2 times per day    enoxaparin  40 mg Subcutaneous Daily    pantoprazole  40 mg Intravenous BID    scopolamine  1 patch Transdermal Q72H    sennosides-docusate sodium  2 tablet Oral BID    ondansetron  4 mg Intravenous Q4H      Infusions:    dextrose 5% and 0.45% NaCl with KCl 20 mEq 125 mL/hr at 05/04/21 1053    sodium chloride       PRN Meds: diatrizoate meglumine-sodium, 60 mL, ONCE PRN  ipratropium-albuterol, 1 ampule, Q4H PRN  HYDROmorphone, 1 mg, Q2H PRN  sodium chloride flush, 10 mL, PRN  sodium chloride, 25 mL, PRN  potassium chloride, 10 mEq, PRN  magnesium sulfate, 1,000 mg, PRN  oxyCODONE-acetaminophen, 2 tablet, Q4H PRN  promethazine, 12.5 mg, Q6H PRN  prochlorperazine, 10 mg, Q6H PRN  hydrALAZINE (APRESOLINE) ivpb, 10 mg, Q6H PRN          Electronically signed by John Funes MD on 5/4/2021 at 12:20 PM

## 2021-05-07 ENCOUNTER — TELEPHONE (OUTPATIENT)
Dept: BARIATRICS/WEIGHT MGMT | Age: 56
End: 2021-05-07

## 2021-05-07 NOTE — TELEPHONE ENCOUNTER
Called Verna to see how they were doing post-operatively. Protein intake is around 20 grams per day. Will work on increasing protein. Reports good hydration is roughly 24-32 oz and urine is clear. Incision sites are healing well and denies any erythema or drainage. Educated on post operative care. Pain medication regimen is working well and denies any nausea or vomiting. Bowel movements are normal and last BM was yesterday. Encouraged exercise and getting up and moving around at least every hour to prevent pneumonia/DVT's. Confirmed post op appointment with Dr. Renzo Cintron and aware to call with any questions or concerns. Having gas pain.    Has gotten on the treadmill on a very low speed

## 2021-05-12 ENCOUNTER — OFFICE VISIT (OUTPATIENT)
Dept: BARIATRICS/WEIGHT MGMT | Age: 56
End: 2021-05-12

## 2021-05-12 VITALS
HEART RATE: 115 BPM | TEMPERATURE: 98.5 F | OXYGEN SATURATION: 96 % | DIASTOLIC BLOOD PRESSURE: 78 MMHG | WEIGHT: 240.9 LBS | HEIGHT: 66 IN | SYSTOLIC BLOOD PRESSURE: 120 MMHG | BODY MASS INDEX: 38.72 KG/M2

## 2021-05-12 DIAGNOSIS — Z98.84 STATUS POST LAPAROSCOPIC SLEEVE GASTRECTOMY: Primary | ICD-10-CM

## 2021-05-12 PROCEDURE — 99024 POSTOP FOLLOW-UP VISIT: CPT | Performed by: SURGERY

## 2021-05-12 NOTE — PROGRESS NOTES
BARIATRIC SURGERY POST OPERATIVE NOTE    SUBJECTIVE:    Patient presenting today referred from Marleni Belle MD, for   Chief Complaint   Patient presents with    Post-Op Check     PO FIDEL SG @ [facility] 5/4/21     /78   Pulse 115   Temp 98.5 °F (36.9 °C)   Ht 5' 6\" (1.676 m)   Wt 240 lb 14.4 oz (109.3 kg)   SpO2 96%   BMI 38.88 kg/m²      HPI: Ayla Petit is a 54 y.o. female s/p sleeve 5/3/21    Doing very well, hydration wise, and prtn wise.     Current Outpatient Medications:     Calcium Citrate-Vitamin D (CALCIUM + VIT D, BARIATRIC ADVANTAGE, CHEWABLE TABLET), Take 1 tablet by mouth daily, Disp: 30 tablet, Rfl: 5    Multiple Vitamin (MVI, BARIATRIC ADVANTAGE MULTI-FORMULA, CHEW TAB), Take 1 tablet by mouth daily, Disp: 30 tablet, Rfl: 5    senna-docusate (SENOKOT S) 8.6-50 MG per tablet, Take 2 tablets by mouth daily for 10 days, Disp: 60 tablet, Rfl: 3    pantoprazole (PROTONIX) 40 MG tablet, Take 1 tablet by mouth 2 times daily, Disp: 60 tablet, Rfl: 3    amLODIPine (NORVASC) 2.5 MG tablet, TAKE 1 TABLET BY MOUTH ONE TIME A DAY, Disp: , Rfl:     montelukast (SINGULAIR) 10 MG tablet, TAKE 1 TABLET BY MOUTH AT BEDTIME, Disp: , Rfl:     ondansetron (ZOFRAN ODT) 4 MG disintegrating tablet, Take 1 tablet by mouth every 4 hours as needed for Nausea or Vomiting (Patient not taking: Reported on 5/12/2021), Disp: 30 tablet, Rfl: 3    budesonide-formoterol (SYMBICORT) 160-4.5 MCG/ACT AERO, Inhale 2 puffs into the lungs 2 times daily (Patient not taking: Reported on 5/12/2021), Disp: 1 Inhaler, Rfl: 5    albuterol sulfate HFA (VENTOLIN HFA) 108 (90 Base) MCG/ACT inhaler, Inhale 2 puffs into the lungs every 6 hours as needed for Wheezing, Disp: , Rfl:     acetaminophen (AMINOFEN) 325 MG tablet, Take 1 tablet by mouth every 4 hours as needed for Pain., Disp: 120 tablet, Rfl: 3  Past Medical History:   Diagnosis Date    Asthma     last flare up Feb 2021\" follow with Dr Chapo Steen Hypertension     \"hx - on medication since 2020    Irregular heartbeat     \"had to wear heart monitor for 3 days\"2020\"\"family dr did this- said heart rate alittle high otherwise was not concerned\"    Wears glasses     to read      Past Surgical History:   Procedure Laterality Date    COLONOSCOPY  2020    ENDOMETRIAL ABLATION N/A 07/2020    SLEEVE GASTRECTOMY N/A 5/3/2021    GASTRECTOMY SLEEVE LAPAROSCOPIC ROBOTIC, HIATAL HERNIA REPAIR performed by Aury Robertson MD at [address] [place] [address] ENDOSCOPY N/A 12/21/2020    EGD BIOPSY performed by Aury Robertson MD at [address] [place] [address]    WISDOM TOOTH EXTRACTION       Social History     Socioeconomic History    Marital status:      Spouse name: None    Number of children: None    Years of education: None    Highest education level: None   Occupational History    None   Social Needs    Financial resource strain: None    Food insecurity     Worry: None     Inability: None    Transportation needs     Medical: None     Non-medical: None   Tobacco Use    Smoking status: Never Smoker    Smokeless tobacco: Never Used   Substance and Sexual Activity    Alcohol use:  Yes     Alcohol/week: 7.0 - 14.0 standard drinks     Types: 7 - 14 Cans of beer per week     Drinks per session: 1 or 2     Comment: average\"2 times per week- last drank 4/20/2021- to stop for my surgery\"    Drug use: No    Sexual activity: None   Lifestyle    Physical activity     Days per week: None     Minutes per session: None    Stress: None   Relationships    Social connections     Talks on phone: None     Gets together: None     Attends Hoahaoism service: None     Active member of club or organization: None     Attends meetings of clubs or organizations: None     Relationship status: None    Intimate partner violence     Fear of current or ex partner: None     Emotionally abused: None     Physically abused: None     Forced sexual activity: None   Other Topics Concern    None   Social History Narrative    None     Family History   Problem Relation Age of Onset    Cancer Mother         cervical    Dementia Mother     Asthma Mother     Hypertension Father     Diabetes Half-Brother      Review of Systems      OBJECTIVE:    Physical Exam      Assessment / Plan:    1. Status post laparoscopic sleeve gastrectomy        Doing very well, hydration wise, and prtn wise. Will Rx MVI and Ca/D. Follow Up:  Return in about 4 weeks (around 6/9/2021) for Bariatric follow up: diet, exercise & weight loss.     Archana Bueno MD, FACS, FICS  Member of the [employer] of Metabolic and Bariatric Surgeons    [phone]    5/12/21

## 2021-06-08 ENCOUNTER — HOSPITAL ENCOUNTER (OUTPATIENT)
Dept: WOMENS IMAGING | Age: 56
Discharge: HOME | End: 2021-06-08
Payer: COMMERCIAL

## 2021-06-08 DIAGNOSIS — Z12.31 ENCOUNTER FOR SCREENING MAMMOGRAM FOR MALIGNANT NEOPLASM OF BREAST: ICD-10-CM

## 2021-06-08 PROCEDURE — 77067 SCR MAMMO BI INCL CAD: CPT

## 2021-06-15 ENCOUNTER — HOSPITAL ENCOUNTER (OUTPATIENT)
Dept: ULTRASOUND IMAGING | Age: 56
Discharge: HOME | End: 2021-06-15
Payer: COMMERCIAL

## 2021-06-15 ENCOUNTER — HOSPITAL ENCOUNTER (OUTPATIENT)
Dept: WOMENS IMAGING | Age: 56
Discharge: HOME | End: 2021-06-15
Payer: COMMERCIAL

## 2021-06-15 DIAGNOSIS — R92.8 ABNORMAL SCREENING MAMMOGRAM: ICD-10-CM

## 2021-06-15 PROCEDURE — G0279 TOMOSYNTHESIS, MAMMO: HCPCS

## 2021-06-23 ENCOUNTER — OFFICE VISIT (OUTPATIENT)
Dept: BARIATRICS/WEIGHT MGMT | Age: 56
End: 2021-06-23

## 2021-06-23 VITALS
WEIGHT: 225.4 LBS | BODY MASS INDEX: 36.22 KG/M2 | OXYGEN SATURATION: 96 % | SYSTOLIC BLOOD PRESSURE: 128 MMHG | HEIGHT: 66 IN | HEART RATE: 80 BPM | DIASTOLIC BLOOD PRESSURE: 72 MMHG | TEMPERATURE: 98.7 F

## 2021-06-23 DIAGNOSIS — Z98.84 STATUS POST BARIATRIC SURGERY: Primary | ICD-10-CM

## 2021-06-23 DIAGNOSIS — Z98.84 STATUS POST LAPAROSCOPIC SLEEVE GASTRECTOMY: ICD-10-CM

## 2021-06-23 PROCEDURE — 99024 POSTOP FOLLOW-UP VISIT: CPT | Performed by: SURGERY

## 2021-06-23 RX ORDER — METHYLPREDNISOLONE ACETATE 80 MG/ML
80 INJECTION, SUSPENSION INTRA-ARTICULAR; INTRALESIONAL; INTRAMUSCULAR; SOFT TISSUE ONCE
COMMUNITY
Start: 2021-06-23 | End: 2021-06-23

## 2021-06-23 NOTE — PROGRESS NOTES
BARIATRIC SURGERY POST OPERATIVE NOTE    SUBJECTIVE:    Patient presenting today referred from Kristy Conklin MD, for   Chief Complaint   Patient presents with   Phoebcarolee Felder Post-Op Check      2nd po SG @ [facility] 5/3/21     /72   Pulse 80   Temp 98.7 °F (37.1 °C)   Ht 5' 6\" (1.676 m)   Wt 225 lb 6.4 oz (102.2 kg)   SpO2 96%   BMI 36.38 kg/m²      HPI: Tej Reddy is a 54 y.o. female s/p sleeve 5/3/21.  And lost 24.8 Lbs since, and 45.5 Lbs total;  800 <  20 min treadmill / bike 24-27 min    Prtn 60 gm      Current Outpatient Medications:     methylPREDNISolone acetate (DEPO-MEDROL) 80 MG/ML injection, Inject 80 mg into the muscle once, Disp: , Rfl:     Calcium Citrate-Vitamin D (CALCIUM + VIT D, BARIATRIC ADVANTAGE, CHEWABLE TABLET), Take 1 tablet by mouth daily, Disp: 30 tablet, Rfl: 5    Multiple Vitamin (MVI, BARIATRIC ADVANTAGE MULTI-FORMULA, CHEW TAB), Take 1 tablet by mouth daily, Disp: 30 tablet, Rfl: 5    pantoprazole (PROTONIX) 40 MG tablet, Take 1 tablet by mouth 2 times daily, Disp: 60 tablet, Rfl: 3    budesonide-formoterol (SYMBICORT) 160-4.5 MCG/ACT AERO, Inhale 2 puffs into the lungs 2 times daily, Disp: 1 Inhaler, Rfl: 5    albuterol sulfate HFA (VENTOLIN HFA) 108 (90 Base) MCG/ACT inhaler, Inhale 2 puffs into the lungs every 6 hours as needed for Wheezing, Disp: , Rfl:     amLODIPine (NORVASC) 2.5 MG tablet, TAKE 1 TABLET BY MOUTH ONE TIME A DAY, Disp: , Rfl:     montelukast (SINGULAIR) 10 MG tablet, TAKE 1 TABLET BY MOUTH AT BEDTIME, Disp: , Rfl:     acetaminophen (AMINOFEN) 325 MG tablet, Take 1 tablet by mouth every 4 hours as needed for Pain., Disp: 120 tablet, Rfl: 3    ondansetron (ZOFRAN ODT) 4 MG disintegrating tablet, Take 1 tablet by mouth every 4 hours as needed for Nausea or Vomiting (Patient not taking: Reported on 6/23/2021), Disp: 30 tablet, Rfl: 3  Past Medical History:   Diagnosis Date    Asthma     last flare up Feb 2021\" follow with Dr Jae Mendez Hypertension \"hx - on medication since 2020    Irregular heartbeat     \"had to wear heart monitor for 3 days\"2020\"\"family dr did this- said heart rate alittle high otherwise was not concerned\"    Wears glasses     to read      Past Surgical History:   Procedure Laterality Date    COLONOSCOPY  2020    ENDOMETRIAL ABLATION N/A 07/2020    SLEEVE GASTRECTOMY N/A 5/3/2021    GASTRECTOMY SLEEVE LAPAROSCOPIC ROBOTIC, HIATAL HERNIA REPAIR performed by Panda Callahan MD at [address] [place] [address] ENDOSCOPY N/A 12/21/2020    EGD BIOPSY performed by Panda Callahan MD at [address] [place] [address]    WISDOM TOOTH EXTRACTION       Social History     Socioeconomic History    Marital status:      Spouse name: None    Number of children: None    Years of education: None    Highest education level: None   Occupational History    None   Tobacco Use    Smoking status: Never Smoker    Smokeless tobacco: Never Used   Vaping Use    Vaping Use: Never used   Substance and Sexual Activity    Alcohol use: Yes     Alcohol/week: 7.0 - 14.0 standard drinks     Types: 7 - 14 Cans of beer per week     Comment: average\"2 times per week- last drank 4/20/2021- to stop for my surgery\"    Drug use: No    Sexual activity: None   Other Topics Concern    None   Social History Narrative    None     Social Determinants of Health     Financial Resource Strain:     Difficulty of Paying Living Expenses:    Food Insecurity:     Worried About Running Out of Food in the Last Year:     Ran Out of Food in the Last Year:    Transportation Needs:     Lack of Transportation (Medical):      Lack of Transportation (Non-Medical):    Physical Activity:     Days of Exercise per Week:     Minutes of Exercise per Session:    Stress:     Feeling of Stress :    Social Connections:     Frequency of Communication with Friends and Family:     Frequency of Social Gatherings with Friends and Family:     Attends Restorationist Services:     Active Member of Clubs or Organizations:     Attends Club or Organization Meetings:     Marital Status:    Intimate Partner Violence:     Fear of Current or Ex-Partner:     Emotionally Abused:     Physically Abused:     Sexually Abused:      Family History   Problem Relation Age of Onset    Cancer Mother         cervical    Dementia Mother     Asthma Mother     Hypertension Father     Diabetes Half-Brother     Breast Cancer Maternal Cousin 60     Review of Systems      OBJECTIVE:    Physical Exam    No orders of the defined types were placed in this encounter. Assessment / Plan:    1. Status post bariatric surgery    2. Status post laparoscopic sleeve gastrectomy          Kcal 800 <  20 min treadmill / bike 24-27 min    Prtn 60 gm    DOING EXCELLENT!, f/u 6 wks. MVI / Ca/D    Follow Up:  Return in about 6 weeks (around 8/4/2021) for Bariatric follow up: diet, exercise & weight loss, Follow up Symptoms.     Geovanni Severino MD, FACS, FICS  Member of the [employer] of Metabolic and Bariatric Surgeons    [phone]    6/23/21

## 2021-08-04 ENCOUNTER — OFFICE VISIT (OUTPATIENT)
Dept: BARIATRICS/WEIGHT MGMT | Age: 56
End: 2021-08-04
Payer: COMMERCIAL

## 2021-08-04 VITALS
HEIGHT: 66 IN | HEART RATE: 89 BPM | SYSTOLIC BLOOD PRESSURE: 126 MMHG | BODY MASS INDEX: 32.14 KG/M2 | OXYGEN SATURATION: 100 % | DIASTOLIC BLOOD PRESSURE: 82 MMHG | WEIGHT: 200 LBS

## 2021-08-04 DIAGNOSIS — Z98.84 STATUS POST LAPAROSCOPIC SLEEVE GASTRECTOMY: Primary | ICD-10-CM

## 2021-08-04 PROCEDURE — 99213 OFFICE O/P EST LOW 20 MIN: CPT | Performed by: SURGERY

## 2021-08-04 ASSESSMENT — ENCOUNTER SYMPTOMS
COUGH: 0
ABDOMINAL PAIN: 0
VOMITING: 0
TROUBLE SWALLOWING: 0
BLOOD IN STOOL: 0
VOICE CHANGE: 0
COLOR CHANGE: 0
ANAL BLEEDING: 0
WHEEZING: 0
CONSTIPATION: 0
DIARRHEA: 0
NAUSEA: 0
SHORTNESS OF BREATH: 0
SORE THROAT: 0
PHOTOPHOBIA: 0

## 2021-08-04 NOTE — PROGRESS NOTES
BARIATRIC SURGERY POST OPERATIVE NOTE    SUBJECTIVE:    Patient presenting today referred from Olga Hicks MD, for   Chief Complaint   Patient presents with   [facility]     s/p /sg 5/3/21     /82   Pulse 89   Ht 5' 6\" (1.676 m)   Wt 200 lb (90.7 kg)   SpO2 100%   BMI 32.28 kg/m²      HPI: Raul Valdes is a 54 y.o. female lost 25 Lbs since surgery 70.9 Lbs total!!! Exercises every day 20 min on her Treadmill, and 30 min ~ with dog walking hills. Prtn 60 gm or more. Cesar Callahan MVI / Ca/D.       Current Outpatient Medications:     Calcium Citrate-Vitamin D (CALCIUM + VIT D, BARIATRIC ADVANTAGE, CHEWABLE TABLET), Take 1 tablet by mouth daily, Disp: 30 tablet, Rfl: 5    Multiple Vitamin (MVI, BARIATRIC ADVANTAGE MULTI-FORMULA, CHEW TAB), Take 1 tablet by mouth daily, Disp: 30 tablet, Rfl: 5    ondansetron (ZOFRAN ODT) 4 MG disintegrating tablet, Take 1 tablet by mouth every 4 hours as needed for Nausea or Vomiting (Patient not taking: Reported on 6/23/2021), Disp: 30 tablet, Rfl: 3    pantoprazole (PROTONIX) 40 MG tablet, Take 1 tablet by mouth 2 times daily, Disp: 60 tablet, Rfl: 3    budesonide-formoterol (SYMBICORT) 160-4.5 MCG/ACT AERO, Inhale 2 puffs into the lungs 2 times daily, Disp: 1 Inhaler, Rfl: 5    albuterol sulfate HFA (VENTOLIN HFA) 108 (90 Base) MCG/ACT inhaler, Inhale 2 puffs into the lungs every 6 hours as needed for Wheezing, Disp: , Rfl:     amLODIPine (NORVASC) 2.5 MG tablet, TAKE 1 TABLET BY MOUTH ONE TIME A DAY, Disp: , Rfl:     montelukast (SINGULAIR) 10 MG tablet, TAKE 1 TABLET BY MOUTH AT BEDTIME, Disp: , Rfl:     acetaminophen (AMINOFEN) 325 MG tablet, Take 1 tablet by mouth every 4 hours as needed for Pain., Disp: 120 tablet, Rfl: 3  Past Medical History:   Diagnosis Date    Asthma     last flare up Feb 2021\" follow with Dr Berlin Ko    Hypertension     \"hx - on medication since 2020    Irregular heartbeat     \"had to wear heart monitor for 3 days\"2020\"\"family dr did this- said heart rate alittle high otherwise was not concerned\"    Wears glasses     to read      Past Surgical History:   Procedure Laterality Date    COLONOSCOPY  2020    ENDOMETRIAL ABLATION N/A 07/2020    SLEEVE GASTRECTOMY N/A 5/3/2021    GASTRECTOMY SLEEVE LAPAROSCOPIC ROBOTIC, HIATAL HERNIA REPAIR performed by Janice Knight MD at [facility] ENDOSCOPY N/A 12/21/2020    EGD BIOPSY performed by Janice Knight MD at [address] EXTRACTION       Social History     Socioeconomic History    Marital status:      Spouse name: None    Number of children: None    Years of education: None    Highest education level: None   Occupational History    None   Tobacco Use    Smoking status: Never Smoker    Smokeless tobacco: Never Used   Vaping Use    Vaping Use: Never used   Substance and Sexual Activity    Alcohol use: Yes     Alcohol/week: 7.0 - 14.0 standard drinks     Types: 7 - 14 Cans of beer per week     Comment: average\"2 times per week- last drank 4/20/2021- to stop for my surgery\"    Drug use: No    Sexual activity: None   Other Topics Concern    None   Social History Narrative    None     Social Determinants of Health     Financial Resource Strain:     Difficulty of Paying Living Expenses:    Food Insecurity:     Worried About Running Out of Food in the Last Year:     Ran Out of Food in the Last Year:    Transportation Needs:     Lack of Transportation (Medical):      Lack of Transportation (Non-Medical):    Physical Activity:     Days of Exercise per Week:     Minutes of Exercise per Session:    Stress:     Feeling of Stress :    Social Connections:     Frequency of Communication with Friends and Family:     Frequency of Social Gatherings with Friends and Family:     Attends Confucianism Services:     Active Member of Clubs or Organizations:     No friction rub. No gallop. Pulmonary:      Effort: Pulmonary effort is normal. No respiratory distress. Breath sounds: No stridor. No wheezing or rales. Chest:      Chest wall: No tenderness. Abdominal:      General: Bowel sounds are normal. There is no distension. Palpations: Abdomen is soft. There is no mass. Tenderness: There is no abdominal tenderness. There is no guarding or rebound. Musculoskeletal:         General: No tenderness. Normal range of motion. Cervical back: Normal range of motion. Lymphadenopathy:      Cervical: No cervical adenopathy. Skin:     General: Skin is warm and dry. Coloration: Skin is not pale. Findings: No erythema or rash. Neurological:      Mental Status: She is alert and oriented to person, place, and time. Cranial Nerves: No cranial nerve deficit. Coordination: Coordination normal.   Psychiatric:         Behavior: Behavior normal.         Thought Content: Thought content normal.         Judgment: Judgment normal.       Assessment / Plan:    1. Status post laparoscopic sleeve gastrectomy           Afsaneh Rivera is a 54 y.o. female lost 25 Lbs since surgery 70.9 Lbs total!!! Exercises every day 20 min on her Treadmill, and 30 min ~ with dog walking hills. Prtn 60 gm or more. Heather Money MVI / Ca/D. Follow Up:  Return in about 3 months (around 11/4/2021) for Bariatric follow up: diet, exercise & weight loss, Follow up Symptoms.     Dawit Jordan MD, FACS, FICS  Member of the [employer] of Met[employer] and Bariatric Sur[employer]    [phone]    8/4/21

## 2021-09-28 ENCOUNTER — TELEPHONE (OUTPATIENT)
Dept: BARIATRICS/WEIGHT MGMT | Age: 56
End: 2021-09-28

## 2021-10-15 ENCOUNTER — CLINICAL DOCUMENTATION (OUTPATIENT)
Dept: OTHER | Age: 56
End: 2021-10-15

## 2021-11-04 ENCOUNTER — OFFICE VISIT (OUTPATIENT)
Dept: BARIATRICS/WEIGHT MGMT | Age: 56
End: 2021-11-04
Payer: COMMERCIAL

## 2021-11-04 ENCOUNTER — HOSPITAL ENCOUNTER (OUTPATIENT)
Age: 56
Discharge: HOME | End: 2021-11-04
Payer: COMMERCIAL

## 2021-11-04 VITALS
HEART RATE: 79 BPM | SYSTOLIC BLOOD PRESSURE: 114 MMHG | OXYGEN SATURATION: 96 % | DIASTOLIC BLOOD PRESSURE: 74 MMHG | BODY MASS INDEX: 29.15 KG/M2 | HEIGHT: 66 IN | WEIGHT: 181.4 LBS

## 2021-11-04 DIAGNOSIS — Z98.84 S/P LAPAROSCOPIC SLEEVE GASTRECTOMY: ICD-10-CM

## 2021-11-04 DIAGNOSIS — Z98.84 S/P LAPAROSCOPIC SLEEVE GASTRECTOMY: Primary | ICD-10-CM

## 2021-11-04 LAB
BASOPHILS ABSOLUTE: 0 K/CU MM
BASOPHILS RELATIVE PERCENT: 0.6 % (ref 0–1)
CALCIUM SERPL-MCNC: 9.5 MG/DL (ref 8.3–10.6)
CHOLESTEROL: 190 MG/DL
DIFFERENTIAL TYPE: ABNORMAL
EOSINOPHILS ABSOLUTE: 0.1 K/CU MM
EOSINOPHILS RELATIVE PERCENT: 1.7 % (ref 0–3)
FOLATE: 19.5 NG/ML (ref 3.1–17.5)
HCT VFR BLD CALC: 42.6 % (ref 37–47)
HDLC SERPL-MCNC: 80 MG/DL
HEMOGLOBIN: 13.4 GM/DL (ref 12.5–16)
IMMATURE NEUTROPHIL %: 0.6 % (ref 0–0.43)
IRON: 122 UG/DL (ref 37–145)
LDL CHOLESTEROL DIRECT: 100 MG/DL
LYMPHOCYTES ABSOLUTE: 1.2 K/CU MM
LYMPHOCYTES RELATIVE PERCENT: 35.6 % (ref 24–44)
MAGNESIUM: 1.8 MG/DL (ref 1.8–2.4)
MCH RBC QN AUTO: 34 PG (ref 27–31)
MCHC RBC AUTO-ENTMCNC: 31.5 % (ref 32–36)
MCV RBC AUTO: 108.1 FL (ref 78–100)
MONOCYTES ABSOLUTE: 0.4 K/CU MM
MONOCYTES RELATIVE PERCENT: 11.4 % (ref 0–4)
NUCLEATED RBC %: 0 %
PCT TRANSFERRIN: 44 % (ref 10–44)
PDW BLD-RTO: 13.2 % (ref 11.7–14.9)
PLATELET # BLD: 200 K/CU MM (ref 140–440)
PMV BLD AUTO: 9.5 FL (ref 7.5–11.1)
RBC # BLD: 3.94 M/CU MM (ref 4.2–5.4)
SEGMENTED NEUTROPHILS ABSOLUTE COUNT: 1.7 K/CU MM
SEGMENTED NEUTROPHILS RELATIVE PERCENT: 50.1 % (ref 36–66)
TOTAL IMMATURE NEUTOROPHIL: 0.02 K/CU MM
TOTAL IRON BINDING CAPACITY: 278 UG/DL (ref 250–450)
TOTAL NUCLEATED RBC: 0 K/CU MM
TRIGL SERPL-MCNC: 80 MG/DL
UNSATURATED IRON BINDING CAPACITY: 156 UG/DL (ref 110–370)
VITAMIN B-12: 405.8 PG/ML (ref 211–911)
VITAMIN D 25-HYDROXY: 60 NG/ML
WBC # BLD: 3.4 K/CU MM (ref 4–10.5)

## 2021-11-04 PROCEDURE — 84425 ASSAY OF VITAMIN B-1: CPT

## 2021-11-04 PROCEDURE — 83540 ASSAY OF IRON: CPT

## 2021-11-04 PROCEDURE — 82306 VITAMIN D 25 HYDROXY: CPT

## 2021-11-04 PROCEDURE — 85025 COMPLETE CBC W/AUTO DIFF WBC: CPT

## 2021-11-04 PROCEDURE — 80061 LIPID PANEL: CPT

## 2021-11-04 PROCEDURE — 82310 ASSAY OF CALCIUM: CPT

## 2021-11-04 PROCEDURE — 84630 ASSAY OF ZINC: CPT

## 2021-11-04 PROCEDURE — 36415 COLL VENOUS BLD VENIPUNCTURE: CPT

## 2021-11-04 PROCEDURE — 82607 VITAMIN B-12: CPT

## 2021-11-04 PROCEDURE — 83721 ASSAY OF BLOOD LIPOPROTEIN: CPT

## 2021-11-04 PROCEDURE — 99213 OFFICE O/P EST LOW 20 MIN: CPT | Performed by: NURSE PRACTITIONER

## 2021-11-04 PROCEDURE — 82746 ASSAY OF FOLIC ACID SERUM: CPT

## 2021-11-04 PROCEDURE — 83735 ASSAY OF MAGNESIUM: CPT

## 2021-11-04 PROCEDURE — 83550 IRON BINDING TEST: CPT

## 2021-11-04 ASSESSMENT — ENCOUNTER SYMPTOMS
CHEST TIGHTNESS: 0
SHORTNESS OF BREATH: 0
SORE THROAT: 0
NAUSEA: 0
PHOTOPHOBIA: 0
APNEA: 0
WHEEZING: 0
COLOR CHANGE: 0
BACK PAIN: 0
DIARRHEA: 0

## 2021-11-04 NOTE — PROGRESS NOTES
BARIATRIC SURGERY OFFICE PROGRESS NOTE    SUBJECTIVE:    Patient presenting today referred from Christoph Crandall MD, for   Chief Complaint   Patient presents with    Follow-up     6 month f/u s/p sleeve 5/3/21 Dr. Suman Smallwood. Patient has questions about vitamin deficiencies as well as joint pain. .    Vitals:    11/04/21 1317   BP: 114/74   Pulse: 79   SpO2: 96%        BMI: Body mass index is 29.28 kg/m². Weight History: Wt Readings from Last 3 Encounters:   11/04/21 181 lb 6.4 oz (82.3 kg)   08/04/21 200 lb (90.7 kg)   06/23/21 225 lb 6.4 oz (102.2 kg)       If within 30 days of bariatric surgery date, have you been to the ED: N/A, No, Yes - NO      HPI:Afsaneh Savage is a 64 y.o. female presenting for 6 month bariatric POST-OP visit    Total weight loss/gain: -18.6 lbs since last visit, 68.8 lbs since surgery, -89.5 lbs since starting program     Diet Recall:    Water intake: Drinks atleast 64 oz daily  Protein intake: protein with each meal. States that she is under 1000 every day  Exercise: everyday  Vitamins: supplementing  Resolution of Comorbid conditions/off meds: Sees Dr. Quita Perry next month and states that she thinks he will take her off then. - Incisions well healed. No signs of infection    - Tolerating current diet. No nausea or vomiting    - BM's are normal.    - No complaints at this time. Thoroughly reviewed the patient's medical history, family history, social history and review of systems with the patient today in the office. Please see medical record for pertinent positives.       Past Medical History:   Diagnosis Date    Asthma     last flare up Feb 2021\" follow with Dr Agnes Cornejo Hypertension     \"hx - on medication since 2020    Irregular heartbeat     \"had to wear heart monitor for 3 days\"2020\"\"family dr did this- said heart rate alittle high otherwise was not concerned\"    Wears glasses     to read        Patient Active Problem List   Diagnosis    Cellulitis    Morbid obesity with BMI of 40.0-44.9, adult ([facility] [address]    Essential hypertension    Mild persistent asthma without complication    GERD (gastroesophageal reflux disease)    Morbid obesity (HCC)    Status post laparoscopic sleeve gastrectomy    Status post bariatric surgery       Past Surgical History:   Procedure Laterality Date    COLONOSCOPY 2020    ENDOMETRIAL ABLATION N/A 07/2020    SLEEVE GASTRECTOMY N/A 5/3/2021    GASTRECTOMY SLEEVE LAPAROSCOPIC ROBOTIC, HIATAL HERNIA REPAIR performed by Ej Frazier MD at [address] [address] ENDOSCOPY N/A 12/21/2020    EGD BIOPSY performed by Ej Frazier MD at [address] [facility]         Current Outpatient Medications   Medication Sig Dispense Refill    Calcium Citrate-Vitamin D (CALCIUM + VIT D, BARIATRIC ADVANTAGE, CHEWABLE TABLET) Take 1 tablet by mouth daily 30 tablet 5    Multiple Vitamin (MVI, BARIATRIC ADVANTAGE MULTI-FORMULA, CHEW TAB) Take 1 tablet by mouth daily 30 tablet 5    ondansetron (ZOFRAN ODT) 4 MG disintegrating tablet Take 1 tablet by mouth every 4 hours as needed for Nausea or Vomiting (Patient not taking: Reported on 6/23/2021) 30 tablet 3    pantoprazole (PROTONIX) 40 MG tablet Take 1 tablet by mouth 2 times daily 60 tablet 3    budesonide-formoterol (SYMBICORT) 160-4.5 MCG/ACT AERO Inhale 2 puffs into the lungs 2 times daily 1 Inhaler 5    albuterol sulfate HFA (VENTOLIN HFA) 108 (90 Base) MCG/ACT inhaler Inhale 2 puffs into the lungs every 6 hours as needed for Wheezing      amLODIPine (NORVASC) 2.5 MG tablet TAKE 1 TABLET BY MOUTH ONE TIME A DAY      montelukast (SINGULAIR) 10 MG tablet TAKE 1 TABLET BY MOUTH AT BEDTIME      acetaminophen (AMINOFEN) 325 MG tablet Take 1 tablet by mouth every 4 hours as needed for Pain. 120 tablet 3     No current facility-administered medications for this visit.         Allergies   Allergen Reactions    Bactrim pulses. Heart sounds: Normal heart sounds. Pulmonary:      Effort: Pulmonary effort is normal.      Breath sounds: Normal breath sounds. Abdominal:      General: Bowel sounds are normal.   Musculoskeletal:         General: Normal range of motion. Cervical back: Normal range of motion and neck supple. Skin:     General: Skin is warm and dry. Neurological:      General: No focal deficit present. Mental Status: She is alert and oriented to person, place, and time. Mental status is at baseline. Psychiatric:         Mood and Affect: Mood normal.         Behavior: Behavior normal.         ASSESSMENT & PLAN:    1. S/P laparoscopic sleeve gastrectomy  - Zinc; Future  - Vitamin D 25 Hydroxy; Future  - Vitamin B12 & Folate; Future  - Vitamin B1; Future  - Magnesium; Future  - Lipid Panel; Future  - Iron and TIBC; Future  - Calcium; Future  - CBC Auto Differential; Future  - Comprehensive Metabolic Panel; Future    - Doing well overall.  - No complaints at this time  - Labs have never been check since surgery    -Patient was encouraged to journal all food intake.   -Keep calorie level at approximately 1000, per discussion / plan with registered dietician.  -Protein intake is to be a minimum of 40-50 grams per day. -Water drinking was encouraged with a goal of 64oz-128oz daily. Beverages are to be calorie free except for milk. Avoid soda. -Continue to increase level of physical activity. As of current visit, regarding obesity-related co-morbid conditions:  LASHA [] compliant [] no longer using [] resolved per sleep study; hypertension [] medications; hyperlipidemia [] medications; GERD [] medications; DM [] insulin [] non-insulin [] no meds       No orders of the defined types were placed in this encounter.     Orders Placed This Encounter   Procedures    Zinc     Standing Status:   Future     Standing Expiration Date:   11/4/2022    Vitamin D 25 Hydroxy     Standing Status:   Future Standing Expiration Date:   11/4/2022    Vitamin B12 & Folate     Standing Status:   Future     Standing Expiration Date:   11/4/2022    Vitamin B1     Standing Status:   Future     Standing Expiration Date:   11/4/2022    Magnesium     Standing Status:   Future     Standing Expiration Date:   11/4/2022    Lipid Panel     Standing Status:   Future     Standing Expiration Date:   11/4/2022     Order Specific Question:   Is Patient Fasting?/# of Hours     Answer:   8    Iron and TIBC     Standing Status:   Future     Standing Expiration Date:   11/4/2022     Order Specific Question:   Is Patient Fasting? Answer:   yes     Order Specific Question:   No of Hours? Answer:   10    Calcium     Standing Status:   Future     Standing Expiration Date:   11/4/2022    CBC Auto Differential     Standing Status:   Future     Standing Expiration Date:   11/4/2022    Comprehensive Metabolic Panel     Standing Status:   Future     Standing Expiration Date:   11/4/2022       Follow Up:  Return in about 3 months (around 2/4/2022).     Yun Radford, APRN - CNP 16-Apr-2021 23:55

## 2021-11-10 LAB
VITAMIN B1, PLASMA: 92 NMOL/L (ref 70–180)
ZINC: 79.2 UG/DL (ref 60–120)

## 2021-11-19 ENCOUNTER — TELEPHONE (OUTPATIENT)
Dept: SURGERY | Age: 56
End: 2021-11-19

## 2021-11-19 NOTE — TELEPHONE ENCOUNTER
Message left for pt to get labs drawn. Pt returned call, stated she is going to get that labs drawn on Monday.

## 2021-11-30 ENCOUNTER — HOSPITAL ENCOUNTER (OUTPATIENT)
Age: 56
Discharge: HOME | End: 2021-11-30
Payer: COMMERCIAL

## 2021-11-30 LAB
BANDED NEUTROPHILS ABSOLUTE COUNT: 0.02 K/CU MM
BANDED NEUTROPHILS RELATIVE PERCENT: 1 % (ref 5–11)
BASOPHILS ABSOLUTE: 0 K/CU MM
BASOPHILS RELATIVE PERCENT: 1 % (ref 0–1)
CALCIUM SERPL-MCNC: 9.4 MG/DL (ref 8.3–10.6)
CHOLESTEROL: 204 MG/DL
DIFFERENTIAL TYPE: ABNORMAL
FOLATE: >20 NG/ML (ref 3.1–17.5)
HCT VFR BLD CALC: 44.6 % (ref 37–47)
HDLC SERPL-MCNC: 86 MG/DL
HEMOGLOBIN: 14.4 GM/DL (ref 12.5–16)
IRON: 54 UG/DL (ref 37–145)
LDL CHOLESTEROL DIRECT: 107 MG/DL
LYMPHOCYTES ABSOLUTE: 0.9 K/CU MM
LYMPHOCYTES RELATIVE PERCENT: 40 % (ref 24–44)
MAGNESIUM: 2 MG/DL (ref 1.8–2.4)
MCH RBC QN AUTO: 34.9 PG (ref 27–31)
MCHC RBC AUTO-ENTMCNC: 32.3 % (ref 32–36)
MCV RBC AUTO: 108 FL (ref 78–100)
MONOCYTES ABSOLUTE: 0.4 K/CU MM
MONOCYTES RELATIVE PERCENT: 16 % (ref 0–4)
PCT TRANSFERRIN: 17 % (ref 10–44)
PDW BLD-RTO: 12.7 % (ref 11.7–14.9)
PLATELET # BLD: 194 K/CU MM (ref 140–440)
PMV BLD AUTO: 9.4 FL (ref 7.5–11.1)
RBC # BLD: 4.13 M/CU MM (ref 4.2–5.4)
SEGMENTED NEUTROPHILS ABSOLUTE COUNT: 0.9 K/CU MM
SEGMENTED NEUTROPHILS RELATIVE PERCENT: 42 % (ref 36–66)
TOTAL IRON BINDING CAPACITY: 311 UG/DL (ref 250–450)
TRIGL SERPL-MCNC: 72 MG/DL
UNSATURATED IRON BINDING CAPACITY: 257 UG/DL (ref 110–370)
VITAMIN B-12: 582.3 PG/ML (ref 211–911)
VITAMIN D 25-HYDROXY: 51.97 NG/ML
WBC # BLD: 2.2 K/CU MM (ref 4–10.5)

## 2021-11-30 PROCEDURE — 82607 VITAMIN B-12: CPT

## 2021-11-30 PROCEDURE — 83721 ASSAY OF BLOOD LIPOPROTEIN: CPT

## 2021-11-30 PROCEDURE — 82306 VITAMIN D 25 HYDROXY: CPT

## 2021-11-30 PROCEDURE — 83550 IRON BINDING TEST: CPT

## 2021-11-30 PROCEDURE — 82310 ASSAY OF CALCIUM: CPT

## 2021-11-30 PROCEDURE — 84425 ASSAY OF VITAMIN B-1: CPT

## 2021-11-30 PROCEDURE — 85027 COMPLETE CBC AUTOMATED: CPT

## 2021-11-30 PROCEDURE — 83735 ASSAY OF MAGNESIUM: CPT

## 2021-11-30 PROCEDURE — 82746 ASSAY OF FOLIC ACID SERUM: CPT

## 2021-11-30 PROCEDURE — 80061 LIPID PANEL: CPT

## 2021-11-30 PROCEDURE — 36415 COLL VENOUS BLD VENIPUNCTURE: CPT

## 2021-11-30 PROCEDURE — 85007 BL SMEAR W/DIFF WBC COUNT: CPT

## 2021-11-30 PROCEDURE — 83540 ASSAY OF IRON: CPT

## 2021-12-04 LAB — VITAMIN B1, PLASMA: 77 NMOL/L (ref 70–180)

## 2022-02-08 ENCOUNTER — OFFICE VISIT (OUTPATIENT)
Dept: BARIATRICS/WEIGHT MGMT | Age: 57
End: 2022-02-08
Payer: COMMERCIAL

## 2022-02-08 VITALS
OXYGEN SATURATION: 100 % | DIASTOLIC BLOOD PRESSURE: 80 MMHG | SYSTOLIC BLOOD PRESSURE: 120 MMHG | WEIGHT: 168 LBS | HEART RATE: 98 BPM | HEIGHT: 66 IN | BODY MASS INDEX: 27 KG/M2

## 2022-02-08 DIAGNOSIS — Z98.84 S/P LAPAROSCOPIC SLEEVE GASTRECTOMY: Primary | ICD-10-CM

## 2022-02-08 PROCEDURE — 99213 OFFICE O/P EST LOW 20 MIN: CPT | Performed by: NURSE PRACTITIONER

## 2022-02-08 ASSESSMENT — ENCOUNTER SYMPTOMS
APNEA: 0
DIARRHEA: 0
NAUSEA: 0
PHOTOPHOBIA: 0
WHEEZING: 0
COLOR CHANGE: 0
SORE THROAT: 0
SHORTNESS OF BREATH: 0
CHEST TIGHTNESS: 0
BACK PAIN: 0

## 2022-02-08 ASSESSMENT — PATIENT HEALTH QUESTIONNAIRE - PHQ9
SUM OF ALL RESPONSES TO PHQ9 QUESTIONS 1 & 2: 0
SUM OF ALL RESPONSES TO PHQ QUESTIONS 1-9: 0
SUM OF ALL RESPONSES TO PHQ QUESTIONS 1-9: 0
2. FEELING DOWN, DEPRESSED OR HOPELESS: 0
1. LITTLE INTEREST OR PLEASURE IN DOING THINGS: 0
SUM OF ALL RESPONSES TO PHQ QUESTIONS 1-9: 0
SUM OF ALL RESPONSES TO PHQ QUESTIONS 1-9: 0

## 2022-02-08 NOTE — PROGRESS NOTES
BARIATRIC SURGERY OFFICE PROGRESS NOTE    SUBJECTIVE:    Patient presenting today referred from Gauri Busby MD, for   Chief Complaint   Patient presents with    Post-Op Check     9mo p/o s/p s/g 5/3/21    . Vitals:    02/08/22 0855   BP: 120/80   Pulse: 98   SpO2: 100%        BMI: Body mass index is 27.12 kg/m². Weight History: Wt Readings from Last 3 Encounters:   02/08/22 168 lb (76.2 kg)   11/04/21 181 lb 6.4 oz (82.3 kg)   08/04/21 200 lb (90.7 kg)        If within 30 days of bariatric surgery date, have you been to the ED: N/A, No, Yes - NA      HPI:Afsaneh Campos is a 64 y.o. female presenting in ninth bariatric POST-OP visit. Total weight loss/gain:   -13.4 lbs since last visit  -82.2 lbs since surgery  -102.9 lbs since starting program    Changes in health since last visit: Denies    Pt tracking calories: denies. Being mindful of what shes eating. States that her portions are still small    Pt exercising: at work    Pt taking vitamins: supplementing    Fluid intake: doing well. Daughter bought her a hydrojug and has been working on increasing fluid intake. No issues.      Past Medical History:   Diagnosis Date    Asthma     last flare up Feb 2021\" follow with Dr Pauline Henriquez Hypertension     \"hx - on medication since 2020    Irregular heartbeat     \"had to wear heart monitor for 3 days\"2020\"\"family dr did this- said heart rate alittle high otherwise was not concerned\"    Wears glasses     to read        Patient Active Problem List   Diagnosis    Cellulitis    Morbid obesity with BMI of 40.0-44.9, adult ([address]    Essential hypertension    Mild persistent asthma without complication    GERD (gastroesophageal reflux disease)    Morbid obesity ([address]    Status post laparoscopic sleeve gastrectomy    Status post bariatric surgery       Past Surgical History:   Procedure Laterality Date    COLONOSCOPY  2020    ENDOMETRIAL ABLATION N/A 07/2020    SLEEVE GASTRECTOMY N/A 5/3/2021 GASTRECTOMY SLEEVE LAPAROSCOPIC ROBOTIC, HIATAL HERNIA REPAIR performed by Marya Rajput MD at [address] [facility] ENDOSCOPY N/A 12/21/2020    EGD BIOPSY performed by Marya Rajput MD at [address] EXTRACTION         Current Outpatient Medications   Medication Sig Dispense Refill    Calcium Citrate-Vitamin D (CALCIUM + VIT D, BARIATRIC ADVANTAGE, CHEWABLE TABLET) Take 1 tablet by mouth daily 30 tablet 5    Multiple Vitamin (MVI, BARIATRIC ADVANTAGE MULTI-FORMULA, CHEW TAB) Take 1 tablet by mouth daily 30 tablet 5    albuterol sulfate HFA (VENTOLIN HFA) 108 (90 Base) MCG/ACT inhaler Inhale 2 puffs into the lungs every 6 hours as needed for Wheezing      acetaminophen (AMINOFEN) 325 MG tablet Take 1 tablet by mouth every 4 hours as needed for Pain. 120 tablet 3    ondansetron (ZOFRAN ODT) 4 MG disintegrating tablet Take 1 tablet by mouth every 4 hours as needed for Nausea or Vomiting (Patient not taking: Reported on 2/8/2022) 30 tablet 3    pantoprazole (PROTONIX) 40 MG tablet Take 1 tablet by mouth 2 times daily (Patient not taking: Reported on 2/8/2022) 60 tablet 3    budesonide-formoterol (SYMBICORT) 160-4.5 MCG/ACT AERO Inhale 2 puffs into the lungs 2 times daily (Patient not taking: Reported on 2/8/2022) 1 Inhaler 5    amLODIPine (NORVASC) 2.5 MG tablet TAKE 1 TABLET BY MOUTH ONE TIME A DAY (Patient not taking: Reported on 2/8/2022)      montelukast (SINGULAIR) 10 MG tablet TAKE 1 TABLET BY MOUTH AT BEDTIME (Patient not taking: Reported on 2/8/2022)       No current facility-administered medications for this visit.         Allergies   Allergen Reactions    Bactrim [Sulfamethoxazole-Trimethoprim] Swelling     Throat swelled    Hibiclens [Chlorhexidine Gluconate] Itching    Other      \"trouble with perfumes, fabric softners exc>>- throat starts itching- headache    Aspirin Rash    Penicillins Rash         Review of Systems   Constitutional: Negative for fatigue and fever. HENT: Negative for congestion, dental problem and sore throat. Eyes: Negative for photophobia and visual disturbance. Respiratory: Negative for apnea, chest tightness, shortness of breath and wheezing. Cardiovascular: Negative for chest pain and leg swelling. Gastrointestinal: Negative for diarrhea and nausea. Endocrine: Negative for cold intolerance and heat intolerance. Genitourinary: Negative for difficulty urinating, dysuria, flank pain, frequency and hematuria. Musculoskeletal: Negative for arthralgias and back pain. Skin: Negative for color change, rash and wound. Allergic/Immunologic: Negative for environmental allergies, food allergies and immunocompromised state. Neurological: Negative for dizziness, weakness, light-headedness and numbness. Hematological: Negative for adenopathy. Does not bruise/bleed easily. Psychiatric/Behavioral: Negative for behavioral problems, confusion, sleep disturbance and suicidal ideas. OBJECTIVE:    /80   Pulse 98   Ht 5' 6\" (1.676 m)   Wt 168 lb (76.2 kg)   SpO2 100%   BMI 27.12 kg/m²      Physical Exam  Vitals reviewed. HENT:      Head: Normocephalic and atraumatic. Right Ear: External ear normal.      Left Ear: External ear normal.      Nose: Nose normal.      Mouth/Throat:      Mouth: Mucous membranes are moist.   Eyes:      Extraocular Movements: Extraocular movements intact. Pupils: Pupils are equal, round, and reactive to light. Cardiovascular:      Rate and Rhythm: Normal rate and regular rhythm. Pulses: Normal pulses. Heart sounds: Normal heart sounds. Pulmonary:      Effort: Pulmonary effort is normal.      Breath sounds: Normal breath sounds. Abdominal:      General: Bowel sounds are normal.   Musculoskeletal:         General: Normal range of motion. Cervical back: Normal range of motion and neck supple. Skin:     General: Skin is warm and dry. Comments: Incisions well healed. Neurological:      General: No focal deficit present. Mental Status: She is alert and oriented to person, place, and time. Mental status is at baseline. Psychiatric:         Mood and Affect: Mood normal.         Behavior: Behavior normal.       ASSESSMENT & PLAN:    1. S/P laparoscopic sleeve gastrectomy  - CBC Auto Differential; Future  - Hemoglobin A1C; Future  - Comprehensive Metabolic Panel; Future  - Iron and TIBC; Futur3  - Lipid Panel; Future  - Magnesium; Future  - TSH with Reflex; Future  - Vitamin B1; Future  - Vitamin D 25 Hydroxy; Future  - Vitamin B12 & Folate; Future  - Zinc; Future    - Doing well. Down -82 lbs since surgery  - Labs reviewed. Discussed elevated cholesterol and different food choices  - Continue to work on diet and weight loss. BMI 27.  - Diet as tolerated  - Increase activity  - RTC 3 months     As of current visit, regarding obesity-related co-morbid conditions:  LASHA [] compliant [] no longer using [] resolved per sleep study; hypertension [] medications; hyperlipidemia [] medications; GERD [] medications; DM [] insulin [] non-insulin [] no meds    The patient expressed understanding and willingness to comply nicely; all questions and concerns addressed. No orders of the defined types were placed in this encounter. Orders Placed This Encounter   Procedures    CBC Auto Differential     Standing Status:   Future     Standing Expiration Date:   2/8/2023    Hemoglobin A1C     Standing Status:   Future     Standing Expiration Date:   2/8/2023    Comprehensive Metabolic Panel     Standing Status:   Future     Standing Expiration Date:   2/8/2023    Iron and TIBC     Standing Status:   Future     Standing Expiration Date:   2/8/2023     Order Specific Question:   Is Patient Fasting? Answer:   yes     Order Specific Question:   No of Hours?      Answer:   8    Lipid Panel     Standing Status:   Future     Standing Expiration Date:   2/8/2023     Order Specific Question:   Is Patient Fasting?/# of Hours     Answer:   yes/8    Magnesium     Standing Status:   Future     Standing Expiration Date:   2/8/2023    TSH with Reflex     Standing Status:   Future     Standing Expiration Date:   2/8/2023    Vitamin B1     Standing Status:   Future     Standing Expiration Date:   2/8/2023    Vitamin D 25 Hydroxy     Standing Status:   Future     Standing Expiration Date:   2/8/2023    Vitamin B12 & Folate     Standing Status:   Future     Standing Expiration Date:   2/8/2023    Zinc     Standing Status:   Future     Standing Expiration Date:   2/8/2023       Follow Up:  Return in about 3 months (around 5/8/2022).     Kenji Romero, APRN - CNP

## 2022-04-27 ENCOUNTER — HOSPITAL ENCOUNTER (OUTPATIENT)
Age: 57
Discharge: HOME | End: 2022-04-27
Payer: COMMERCIAL

## 2022-04-27 DIAGNOSIS — Z98.84 S/P LAPAROSCOPIC SLEEVE GASTRECTOMY: ICD-10-CM

## 2022-04-27 LAB
ALBUMIN SERPL-MCNC: 4.5 GM/DL (ref 3.4–5)
ALP BLD-CCNC: 88 IU/L (ref 40–129)
ALT SERPL-CCNC: 16 U/L (ref 10–40)
ANION GAP SERPL CALCULATED.3IONS-SCNC: 10 MMOL/L (ref 4–16)
AST SERPL-CCNC: 23 IU/L (ref 15–37)
BASOPHILS ABSOLUTE: 0 K/CU MM
BASOPHILS RELATIVE PERCENT: 1 % (ref 0–1)
BILIRUB SERPL-MCNC: 0.9 MG/DL (ref 0–1)
BUN BLDV-MCNC: 8 MG/DL (ref 6–23)
CALCIUM SERPL-MCNC: 9.8 MG/DL (ref 8.3–10.6)
CHLORIDE BLD-SCNC: 101 MMOL/L (ref 99–110)
CHOLESTEROL: 218 MG/DL
CO2: 28 MMOL/L (ref 21–32)
CREAT SERPL-MCNC: 0.4 MG/DL (ref 0.6–1.1)
DIFFERENTIAL TYPE: ABNORMAL
EOSINOPHILS ABSOLUTE: 0.1 K/CU MM
EOSINOPHILS RELATIVE PERCENT: 2 % (ref 0–3)
ESTIMATED AVERAGE GLUCOSE: 100 MG/DL
FOLATE: >20 NG/ML (ref 3.1–17.5)
GFR AFRICAN AMERICAN: >60 ML/MIN/1.73M2
GFR NON-AFRICAN AMERICAN: >60 ML/MIN/1.73M2
GLUCOSE BLD-MCNC: 89 MG/DL (ref 70–99)
HBA1C MFR BLD: 5.1 % (ref 4.2–6.3)
HCT VFR BLD CALC: 42.7 % (ref 37–47)
HDLC SERPL-MCNC: 85 MG/DL
HEMOGLOBIN: 13.7 GM/DL (ref 12.5–16)
IMMATURE NEUTROPHIL %: 0 % (ref 0–0.43)
IRON: 124 UG/DL (ref 37–145)
LDL CHOLESTEROL CALCULATED: 119 MG/DL
LYMPHOCYTES ABSOLUTE: 1.2 K/CU MM
LYMPHOCYTES RELATIVE PERCENT: 39.7 % (ref 24–44)
MAGNESIUM: 2 MG/DL (ref 1.8–2.4)
MCH RBC QN AUTO: 35 PG (ref 27–31)
MCHC RBC AUTO-ENTMCNC: 32.1 % (ref 32–36)
MCV RBC AUTO: 109.2 FL (ref 78–100)
MONOCYTES ABSOLUTE: 0.4 K/CU MM
MONOCYTES RELATIVE PERCENT: 13.1 % (ref 0–4)
NUCLEATED RBC %: 0 %
PCT TRANSFERRIN: 49 % (ref 10–44)
PDW BLD-RTO: 12 % (ref 11.7–14.9)
PLATELET # BLD: 208 K/CU MM (ref 140–440)
PMV BLD AUTO: 9.8 FL (ref 7.5–11.1)
POTASSIUM SERPL-SCNC: 4.5 MMOL/L (ref 3.5–5.1)
RBC # BLD: 3.91 M/CU MM (ref 4.2–5.4)
SEGMENTED NEUTROPHILS ABSOLUTE COUNT: 1.3 K/CU MM
SEGMENTED NEUTROPHILS RELATIVE PERCENT: 44.2 % (ref 36–66)
SODIUM BLD-SCNC: 139 MMOL/L (ref 135–145)
TOTAL IMMATURE NEUTOROPHIL: 0 K/CU MM
TOTAL IRON BINDING CAPACITY: 253 UG/DL (ref 250–450)
TOTAL NUCLEATED RBC: 0 K/CU MM
TOTAL PROTEIN: 6.9 GM/DL (ref 6.4–8.2)
TRIGL SERPL-MCNC: 71 MG/DL
TSH HIGH SENSITIVITY: 2.48 UIU/ML (ref 0.27–4.2)
UNSATURATED IRON BINDING CAPACITY: 129 UG/DL (ref 110–370)
VITAMIN B-12: 532.4 PG/ML (ref 211–911)
VITAMIN D 25-HYDROXY: 60 NG/ML
WBC # BLD: 3 K/CU MM (ref 4–10.5)

## 2022-04-27 PROCEDURE — 80053 COMPREHEN METABOLIC PANEL: CPT

## 2022-04-27 PROCEDURE — 85025 COMPLETE CBC W/AUTO DIFF WBC: CPT

## 2022-04-27 PROCEDURE — 82607 VITAMIN B-12: CPT

## 2022-04-27 PROCEDURE — 36415 COLL VENOUS BLD VENIPUNCTURE: CPT

## 2022-04-27 PROCEDURE — 83735 ASSAY OF MAGNESIUM: CPT

## 2022-04-27 PROCEDURE — 83036 HEMOGLOBIN GLYCOSYLATED A1C: CPT

## 2022-04-27 PROCEDURE — 82306 VITAMIN D 25 HYDROXY: CPT

## 2022-04-27 PROCEDURE — 84425 ASSAY OF VITAMIN B-1: CPT

## 2022-04-27 PROCEDURE — 84630 ASSAY OF ZINC: CPT

## 2022-04-27 PROCEDURE — 84443 ASSAY THYROID STIM HORMONE: CPT

## 2022-04-27 PROCEDURE — 83550 IRON BINDING TEST: CPT

## 2022-04-27 PROCEDURE — 82746 ASSAY OF FOLIC ACID SERUM: CPT

## 2022-04-27 PROCEDURE — 83540 ASSAY OF IRON: CPT

## 2022-04-27 PROCEDURE — 80061 LIPID PANEL: CPT

## 2022-04-29 LAB — ZINC: 77.1 UG/DL (ref 60–120)

## 2022-05-02 LAB — VITAMIN B1, PLASMA: 116 NMOL/L (ref 70–180)

## 2022-05-03 ENCOUNTER — OFFICE VISIT (OUTPATIENT)
Dept: BARIATRICS/WEIGHT MGMT | Age: 57
End: 2022-05-03
Payer: COMMERCIAL

## 2022-05-03 VITALS
WEIGHT: 163.9 LBS | HEART RATE: 66 BPM | SYSTOLIC BLOOD PRESSURE: 120 MMHG | DIASTOLIC BLOOD PRESSURE: 80 MMHG | OXYGEN SATURATION: 100 % | BODY MASS INDEX: 26.34 KG/M2 | HEIGHT: 66 IN

## 2022-05-03 DIAGNOSIS — Z98.84 S/P LAPAROSCOPIC SLEEVE GASTRECTOMY: ICD-10-CM

## 2022-05-03 DIAGNOSIS — E78.2 MIXED HYPERLIPIDEMIA: Primary | ICD-10-CM

## 2022-05-03 PROCEDURE — 99213 OFFICE O/P EST LOW 20 MIN: CPT | Performed by: NURSE PRACTITIONER

## 2022-05-03 ASSESSMENT — ENCOUNTER SYMPTOMS
PHOTOPHOBIA: 0
BACK PAIN: 0
APNEA: 0
COLOR CHANGE: 0
WHEEZING: 0
SHORTNESS OF BREATH: 0
CHEST TIGHTNESS: 0
NAUSEA: 0
DIARRHEA: 0
SORE THROAT: 0

## 2022-05-03 NOTE — PROGRESS NOTES
BARIATRIC SURGERY OFFICE PROGRESS NOTE    SUBJECTIVE:    Patient presenting today referred from Bushra Light MD, for   Chief Complaint   Patient presents with   Alesia Hernandez Weight Management     s/p s/g 5/3/21 labs completed    . Vitals:    05/03/22 0850   BP: 120/80   Pulse: 66   SpO2: 100%        BMI: Body mass index is 26.45 kg/m². Weight History: Wt Readings from Last 3 Encounters:   05/03/22 163 lb 14.4 oz (74.3 kg)   02/08/22 168 lb (76.2 kg)   11/04/21 181 lb 6.4 oz (82.3 kg)        If within 30 days of bariatric surgery date, have you been to the ED: N/A, No, Yes - NA      HPI:Afsaneh Meadows is a 64 y.o. female presenting in 1 year bariatric POST-OP visit. Total weight loss/gain:   -4.1 lbs since last visit  -86.3 lbs since surgery  -107 lbs since starting program    Changes in health since last visit: denies    Pt tracking calories: loosely    Pt exercising: treadmill everyday    Pt taking vitamins: supplement    Fluid intake: not what it should be. Low back pain.  Getting injection in back    Past Medical History:   Diagnosis Date    Asthma     last flare up Feb 2021\" follow with Dr Rachel Myers Hypertension     \"hx - on medication since 2020    Irregular heartbeat     \"had to wear heart monitor for 3 days\"2020\"\"family dr did this- said heart rate alittle high otherwise was not concerned\"    Wears glasses     to read        Patient Active Problem List   Diagnosis    Cellulitis    Morbid obesity with BMI of 40.0-44.9, adult ([address]    Essential hypertension    Mild persistent asthma without complication    GERD (gastroesophageal reflux disease)    Morbid obesity ([address]    Status post laparoscopic sleeve gastrectomy    Status post bariatric surgery       Past Surgical History:   Procedure Laterality Date    COLONOSCOPY  2020    ENDOMETRIAL ABLATION N/A 07/2020    SLEEVE GASTRECTOMY N/A 5/3/2021    GASTRECTOMY SLEEVE LAPAROSCOPIC ROBOTIC, HIATAL HERNIA REPAIR performed by Nan Naranjo MD at [facility] OR    TONSILLECTOMY  1969    UPPER GASTROINTESTINAL ENDOSCOPY N/A 12/21/2020    EGD BIOPSY performed by Susana Orr MD at [address] [facility] EXTRACTION         Current Outpatient Medications   Medication Sig Dispense Refill    Calcium Citrate-Vitamin D (CALCIUM + VIT D, BARIATRIC ADVANTAGE, CHEWABLE TABLET) Take 1 tablet by mouth daily (Patient not taking: Reported on 5/3/2022) 30 tablet 5    Multiple Vitamin (MVI, BARIATRIC ADVANTAGE MULTI-FORMULA, CHEW TAB) Take 1 tablet by mouth daily (Patient not taking: Reported on 5/3/2022) 30 tablet 5    ondansetron (ZOFRAN ODT) 4 MG disintegrating tablet Take 1 tablet by mouth every 4 hours as needed for Nausea or Vomiting (Patient not taking: Reported on 2/8/2022) 30 tablet 3    pantoprazole (PROTONIX) 40 MG tablet Take 1 tablet by mouth 2 times daily (Patient not taking: Reported on 2/8/2022) 60 tablet 3    budesonide-formoterol (SYMBICORT) 160-4.5 MCG/ACT AERO Inhale 2 puffs into the lungs 2 times daily (Patient not taking: Reported on 2/8/2022) 1 Inhaler 5    albuterol sulfate HFA (VENTOLIN HFA) 108 (90 Base) MCG/ACT inhaler Inhale 2 puffs into the lungs every 6 hours as needed for Wheezing (Patient not taking: Reported on 5/3/2022)      amLODIPine (NORVASC) 2.5 MG tablet TAKE 1 TABLET BY MOUTH ONE TIME A DAY (Patient not taking: Reported on 2/8/2022)      montelukast (SINGULAIR) 10 MG tablet TAKE 1 TABLET BY MOUTH AT BEDTIME (Patient not taking: Reported on 2/8/2022)      acetaminophen (AMINOFEN) 325 MG tablet Take 1 tablet by mouth every 4 hours as needed for Pain. (Patient not taking: Reported on 5/3/2022) 120 tablet 3     No current facility-administered medications for this visit.         Allergies   Allergen Reactions    Bactrim [Sulfamethoxazole-Trimethoprim] Swelling     Throat swelled    Hibiclens [Chlorhexidine Gluconate] Itching    Other      \"trouble with perfumes, fabric softners exc>>- throat starts itching- headache    Aspirin Rash    Penicillins Rash         Review of Systems   Constitutional: Negative for fatigue and fever. HENT: Negative for congestion, dental problem and sore throat. Eyes: Negative for photophobia and visual disturbance. Respiratory: Negative for apnea, chest tightness, shortness of breath and wheezing. Cardiovascular: Negative for chest pain and leg swelling. Gastrointestinal: Negative for diarrhea and nausea. Endocrine: Negative for cold intolerance and heat intolerance. Genitourinary: Negative for difficulty urinating, dysuria, flank pain, frequency and hematuria. Musculoskeletal: Negative for arthralgias and back pain. Skin: Negative for color change, rash and wound. Allergic/Immunologic: Negative for environmental allergies, food allergies and immunocompromised state. Neurological: Negative for dizziness, weakness, light-headedness and numbness. Hematological: Negative for adenopathy. Does not bruise/bleed easily. Psychiatric/Behavioral: Negative for behavioral problems, confusion, sleep disturbance and suicidal ideas. OBJECTIVE:    /80   Pulse 66   Ht 5' 6\" (1.676 m)   Wt 163 lb 14.4 oz (74.3 kg)   SpO2 100%   BMI 26.45 kg/m²      Physical Exam  Vitals reviewed. Constitutional:       Appearance: She is obese. HENT:      Head: Normocephalic and atraumatic. Right Ear: External ear normal.      Left Ear: External ear normal.      Nose: Nose normal.      Mouth/Throat:      Mouth: Mucous membranes are moist.   Eyes:      Extraocular Movements: Extraocular movements intact. Pupils: Pupils are equal, round, and reactive to light. Cardiovascular:      Rate and Rhythm: Normal rate and regular rhythm. Pulses: Normal pulses. Heart sounds: Normal heart sounds. Pulmonary:      Effort: Pulmonary effort is normal.      Breath sounds: Normal breath sounds.    Abdominal:      General: Bowel sounds are normal. Musculoskeletal:         General: Normal range of motion. Cervical back: Normal range of motion and neck supple. Skin:     General: Skin is warm and dry. Neurological:      General: No focal deficit present. Mental Status: She is alert and oriented to person, place, and time. Mental status is at baseline. Psychiatric:         Mood and Affect: Mood normal.         Behavior: Behavior normal.         ASSESSMENT & PLAN:    1. Mixed hyperlipidemia  - Lipid Panel; Future  - Cholesterol still elevated. Continue to monitor  - Has follow up with PCP in August.    2. S/P laparoscopic sleeve gastrectomy    - CBC with Auto Differential; Future  - Comprehensive Metabolic Panel; Future  - Iron and TIBC; Future  - Hemoglobin A1C; Future  - Magnesium; Future  - TSH with Reflex; Future  - Vitamin B1; Future  - Vitamin B12 & Folate; Future  - Vitamin D 25 Hydroxy; Future  - Zinc; Future    - Doing well. - Down 80 lbs since surgery  - BMI 26  - Would like to meet with RD to go over macros and low fat diet.  - RTC 1 year     As of current visit, regarding obesity-related co-morbid conditions:  LASHA [] compliant [] no longer using [] resolved per sleep study; hypertension [] medications; hyperlipidemia [] medications; GERD [] medications; DM [] insulin [] non-insulin [] no meds    The patient expressed understanding and willingness to comply nicely; all questions and concerns addressed. No orders of the defined types were placed in this encounter. Orders Placed This Encounter   Procedures    CBC with Auto Differential     Standing Status:   Future     Standing Expiration Date:   5/3/2023    Comprehensive Metabolic Panel     Standing Status:   Future     Standing Expiration Date:   5/3/2023    Iron and TIBC     Standing Status:   Future     Standing Expiration Date:   5/3/2023     Order Specific Question:   Is Patient Fasting? Answer:   yes     Order Specific Question:   No of Hours?      Answer:   8    Hemoglobin A1C     Standing Status:   Future     Standing Expiration Date:   5/3/2023    Lipid Panel     Standing Status:   Future     Standing Expiration Date:   5/3/2023     Order Specific Question:   Is Patient Fasting?/# of Hours     Answer:   yes/8    Magnesium     Standing Status:   Future     Standing Expiration Date:   5/3/2023    TSH with Reflex     Standing Status:   Future     Standing Expiration Date:   5/3/2023    Vitamin B1     Standing Status:   Future     Standing Expiration Date:   5/3/2023    Vitamin B12 & Folate     Standing Status:   Future     Standing Expiration Date:   5/3/2023    Vitamin D 25 Hydroxy     Standing Status:   Future     Standing Expiration Date:   5/3/2023    Zinc     Standing Status:   Future     Standing Expiration Date:   5/3/2023       Follow Up:  Return in about 1 year (around 5/3/2023).     Mortimer Needle, APRN - CNP

## 2022-05-12 ENCOUNTER — OFFICE VISIT (OUTPATIENT)
Dept: BARIATRICS/WEIGHT MGMT | Age: 57
End: 2022-05-12

## 2022-05-12 VITALS — WEIGHT: 165.8 LBS | BODY MASS INDEX: 26.65 KG/M2 | HEIGHT: 66 IN

## 2022-05-12 DIAGNOSIS — E66.3 OVERWEIGHT: Primary | ICD-10-CM

## 2022-05-12 PROCEDURE — 99999 PR OFFICE/OUTPT VISIT,PROCEDURE ONLY: CPT

## 2022-05-12 NOTE — PROGRESS NOTES
Outpatient Nutrition Counseling    REASON FOR VISIT: Post Op Follow Up    Chief Complaint:    Chief Complaint   Patient presents with    Weight Management       SUBJECTIVE: Pt here for follow up visit 1 yr s/p sleeve gastrectomy. Pt has lost 88 lbs since surgery and 105 lb overall. Doing well overall, exercising, labs WNL, taking vitamins. Trying to drink adequate water. Questions re adequacy of protein, carbs, Keto recipes. Pt instructed on importance of eating regularly to maintain energy, metabolism, carb and protein goals, fluids, continuing vitamins and supplements. Pt verbalized understanding. The patient is a 64 y.o. female being seen for morbid obesity, s/p weight loss surgery; Afsaneh's, Height: 5' 6\" (167.6 cm), Weight: 165 lb 12.8 oz (75.2 kg), Current Body mass index is 26.76 kg/m². The patient's PCP is Corinne Boy, MD       Comorbid Conditions:  Significant diseases affecting this patient are   Past Medical History:   Diagnosis Date    Asthma     last flare up Feb 2021\" follow with Dr Brian Can Hypertension     \"hx - on medication since 2020    Irregular heartbeat     \"had to wear heart monitor for 3 days\"2020\"\"family dr did this- said heart rate alittle high otherwise was not concerned\"    Wears glasses     to read   . Review of Systems - Review of Systems  Otherwise per HPI. Allergies:   Allergies   Allergen Reactions    Bactrim [Sulfamethoxazole-Trimethoprim] Swelling     Throat swelled    Hibiclens [Chlorhexidine Gluconate] Itching    Other      \"trouble with perfumes, fabric softners exc>>- throat starts itching- headache    Aspirin Rash    Penicillins Rash       Past Surgical History:  Past Surgical History:   Procedure Laterality Date    COLONOSCOPY  2020    ENDOMETRIAL ABLATION N/A 07/2020    SLEEVE GASTRECTOMY N/A 5/3/2021    GASTRECTOMY SLEEVE LAPAROSCOPIC ROBOTIC, HIATAL HERNIA REPAIR performed by Chacha Mitchell MD at [address] GASTROINTESTINAL ENDOSCOPY N/A 12/21/2020    EGD BIOPSY performed by Susana Orr MD at [address] [facility] EXTRACTION         Family History:  Family History   Problem Relation Age of Onset    Cancer Mother         cervical    Dementia Mother     Asthma Mother     Hypertension Father     Diabetes Half-Brother     Breast Cancer Maternal Cousin 61       Social History:  Social History     Socioeconomic History    Marital status:      Spouse name: Not on file    Number of children: Not on file    Years of education: Not on file    Highest education level: Not on file   Occupational History    Not on file   Tobacco Use    Smoking status: Never Smoker    Smokeless tobacco: Never Used   Vaping Use    Vaping Use: Never used   Substance and Sexual Activity    Alcohol use: Yes     Alcohol/week: 7.0 - 14.0 standard drinks     Types: 7 - 14 Cans of beer per week     Comment: average\"2 times per week- last drank 4/20/2021- to stop for my surgery\"    Drug use: No    Sexual activity: Not on file   Other Topics Concern    Not on file   Social History Narrative    Not on file     Social Determinants of Health     Financial Resource Strain:     Difficulty of Paying Living Expenses: Not on file   Food Insecurity:     Worried About Running Out of Food in the Last Year: Not on file    [place] of Food in the Last Year: Not on file   Transportation Needs:     Lack of Transportation (Medical): Not on file    Lack of Transportation (Non-Medical):  Not on file   Physical Activity:     Days of Exercise per Week: Not on file    Minutes of Exercise per Session: Not on file   Stress:     Feeling of Stress : Not on file   Social Connections:     Frequency of Communication with Friends and Family: Not on file    Frequency of Social Gatherings with Friends and Family: Not on file    Attends Yazdanism Services: Not on file   [employer] of Clubs or Organizations: Not on file    Attends Club or Organization Meetings: Not on file    Marital Status: Not on file   Intimate Partner Violence:     Fear of Current or Ex-Partner: Not on file    Emotionally Abused: Not on file    Physically Abused: Not on file    Sexually Abused: Not on file   Housing Stability:     Unable to Pay for Housing in the Last Year: Not on file    Number of [place] in the Last Year: Not on file    Unstable Housing in the Last Year: Not on file         OBJECTIVE:  Physical Exam   Ht 5' 6\" (1.676 m)   Wt 165 lb 12.8 oz (75.2 kg)   BMI 26.76 kg/m²        NUTRITION DIAGNOSIS: Overweight   Problem: Increased adiposity compared to reference standard or established norms   Etiology: Excess intake compared to output over time   S/S: Ht: 5' 6\" Wt: 165 lb 12.8 oz BMI: 26.76    NUTRITION INTERVENTIONS:    Individualized treatment goals to address nutritiondiagnosis:   Instructed on 100 gm carb, at least 60 gm protein diet.     Provided reinforcement as above   Encouraged Physical activity as approved by physician    MONITORING/ EVALUATION/ PLAN:   Pt verbalized understanding of allmaterials covered   Pt asked pertinent questions throughout the session - expect compliance with nutrition guidelines presented   Provided pt with contact information should questions arise prior to next visit   Will f/u with pt nicolas Norman MS, RDN, LD  5/12/2022

## 2023-12-01 DIAGNOSIS — Z98.84 HX OF BARIATRIC SURGERY: Primary | ICD-10-CM

## 2023-12-01 NOTE — PROGRESS NOTES
SX: 05/03/2021. Pt last seen 05/03/2022. Pt now in 3yr f/u window (11/2/2023-10/31/2024).       LETTER AND LABS MAILED TO PT

## 2024-03-15 ENCOUNTER — HOSPITAL ENCOUNTER (OUTPATIENT)
Dept: INFUSION THERAPY | Age: 59
Discharge: HOME | End: 2024-03-15
Payer: COMMERCIAL

## 2024-03-15 ENCOUNTER — INITIAL CONSULT (OUTPATIENT)
Dept: ONCOLOGY | Age: 59
End: 2024-03-15
Payer: COMMERCIAL

## 2024-03-15 VITALS
BODY MASS INDEX: 25.11 KG/M2 | HEIGHT: 67 IN | SYSTOLIC BLOOD PRESSURE: 171 MMHG | DIASTOLIC BLOOD PRESSURE: 100 MMHG | HEART RATE: 89 BPM | OXYGEN SATURATION: 96 % | WEIGHT: 160 LBS | TEMPERATURE: 97.1 F

## 2024-03-15 DIAGNOSIS — D72.819 LEUKOPENIA, UNSPECIFIED TYPE: ICD-10-CM

## 2024-03-15 DIAGNOSIS — D72.819 LEUKOPENIA, UNSPECIFIED TYPE: Primary | ICD-10-CM

## 2024-03-15 DIAGNOSIS — D75.89 MACROCYTOSIS WITHOUT ANEMIA: ICD-10-CM

## 2024-03-15 LAB
BASOPHILS ABSOLUTE: 0 K/CU MM
BASOPHILS RELATIVE PERCENT: 0.6 % (ref 0–1)
DIFFERENTIAL TYPE: ABNORMAL
EOSINOPHILS ABSOLUTE: 0 K/CU MM
EOSINOPHILS RELATIVE PERCENT: 1.3 % (ref 0–3)
HCT VFR BLD CALC: 39.6 % (ref 37–47)
HEMOGLOBIN: 13.1 GM/DL (ref 12.5–16)
LYMPHOCYTES ABSOLUTE: 1.3 K/CU MM
LYMPHOCYTES RELATIVE PERCENT: 42 % (ref 24–44)
MCH RBC QN AUTO: 34.7 PG (ref 27–31)
MCHC RBC AUTO-ENTMCNC: 33.1 % (ref 32–36)
MCV RBC AUTO: 105 FL (ref 78–100)
MONOCYTES ABSOLUTE: 0.4 K/CU MM
MONOCYTES RELATIVE PERCENT: 13.7 % (ref 0–4)
PDW BLD-RTO: 12.2 % (ref 11.7–14.9)
PLATELET # BLD: 196 K/CU MM (ref 140–440)
PMV BLD AUTO: 8.7 FL (ref 7.5–11.1)
RBC # BLD: 3.77 M/CU MM (ref 4.2–5.4)
SEGMENTED NEUTROPHILS ABSOLUTE COUNT: 1.3 K/CU MM
SEGMENTED NEUTROPHILS RELATIVE PERCENT: 42.4 % (ref 36–66)
TOTAL PROTEIN: 7.2 GM/DL (ref 6.4–8.2)
TSH SERPL DL<=0.005 MIU/L-ACNC: 1.63 UIU/ML (ref 0.27–4.2)
WBC # BLD: 3.1 K/CU MM (ref 4–10.5)

## 2024-03-15 PROCEDURE — 85025 COMPLETE CBC W/AUTO DIFF WBC: CPT

## 2024-03-15 PROCEDURE — 99202 OFFICE O/P NEW SF 15 MIN: CPT

## 2024-03-15 PROCEDURE — 3077F SYST BP >= 140 MM HG: CPT | Performed by: INTERNAL MEDICINE

## 2024-03-15 PROCEDURE — 84155 ASSAY OF PROTEIN SERUM: CPT

## 2024-03-15 PROCEDURE — 86430 RHEUMATOID FACTOR TEST QUAL: CPT

## 2024-03-15 PROCEDURE — 99204 OFFICE O/P NEW MOD 45 MIN: CPT | Performed by: INTERNAL MEDICINE

## 2024-03-15 PROCEDURE — 36415 COLL VENOUS BLD VENIPUNCTURE: CPT

## 2024-03-15 PROCEDURE — 3080F DIAST BP >= 90 MM HG: CPT | Performed by: INTERNAL MEDICINE

## 2024-03-15 PROCEDURE — 86038 ANTINUCLEAR ANTIBODIES: CPT

## 2024-03-15 PROCEDURE — 84443 ASSAY THYROID STIM HORMONE: CPT

## 2024-03-15 PROCEDURE — 84165 PROTEIN E-PHORESIS SERUM: CPT

## 2024-03-15 RX ORDER — BIOTIN 1 MG
2 TABLET ORAL DAILY
COMMUNITY

## 2024-03-15 ASSESSMENT — PATIENT HEALTH QUESTIONNAIRE - PHQ9
SUM OF ALL RESPONSES TO PHQ QUESTIONS 1-9: 0
SUM OF ALL RESPONSES TO PHQ QUESTIONS 1-9: 0
1. LITTLE INTEREST OR PLEASURE IN DOING THINGS: 0
SUM OF ALL RESPONSES TO PHQ9 QUESTIONS 1 & 2: 0
2. FEELING DOWN, DEPRESSED OR HOPELESS: 0
SUM OF ALL RESPONSES TO PHQ QUESTIONS 1-9: 0
SUM OF ALL RESPONSES TO PHQ QUESTIONS 1-9: 0

## 2024-03-17 NOTE — PROGRESS NOTES
Patient Name:  Afsaneh Martines  Patient :  1965  Patient MRN:  [id number]     Primary Oncologist: Víctor Walter MD  Referring Provider: Edgard Huang MD     Date of Service: 2024      Chief Complaint:  No chief complaint on file.    FU  visit.     Patient Active Problem List:     Cellulitis     Morbid obesity with BMI of 40.0-44.9, adult (HCC)     Essential hypertension     Mild persistent asthma without complication     GERD (gastroesophageal reflux disease)     Morbid obesity (HCC)     Status post laparoscopic sleeve gastrectomy     Status post bariatric surgery     HPI:   Afsaneh Martines is a pleasant 59 yo female patient who was referred for evaluation of elevated ferritin and leukopenia on 3/15/2024.  She has gastric bypass surgery in .  She was taking MVI with high iron and has switched to different MVI.  She drinks couple beers daily. Recommend to cut back on EtOH.    2022 creat 0.4, LFTs wnl. B-12 532, Zinc 77.1, TSH wnl.   WBC 3, Hg 13.7, .2, plat 208. Iron 124, TIBC 253, sat 49. Folate >20.  2023 WBC 3.1, Hg 13.9, .8, plat 192. ALC 0.8, ANC 1.8. Ferritin 256, iron 129, TIBC 263, sat 49.  3/1/2024 WBC 2.4, Hg 13.1, HCT 38.5, .8, plat 206. ANC 0.8. ALC 1.4. CMP wnl. Ferritin 199, iron 104, TIBC 277, sat 38. B-12 358, folate >20.    Reports colonoscopy in  by Dr Morataya.  3/8/2024 mammogram.  3 children with natural delivery. Mother has cervical cancer.  Maternal cousin has breast cancer X2.  Father has pancytopenia.    I reviewed labs. Ferritin is back to wnl.  We discuss about macrocytosis and leukopenia.  Macrocytosis could be related to EtOH. In  US abdomen showed fatty liver.  I ordered flow cytometry study, KARLA, RF and thyroid function.  We discuss about potential BM study. I ordered US of abdomen to reassess the fatty liver.    2024 follow up visit.    3/15/2024 TSH wnl.   WBC 3.1, Hg 13.1, MCv 105, plat 196.  KARLA not detected, RF <10.   SPEP: Increase in

## 2024-03-18 LAB
NUCLEAR IGG SER QL IA: NORMAL
RHEUMATOID FACTOR: <10 IU/ML (ref 0–14)

## 2024-03-19 LAB
ALBUMIN SERPL ELPH-MCNC: 4.1 GM/DL (ref 3.2–5.6)
ALPHA-1-GLOBULIN: 0.6 GM/DL (ref 0.1–0.4)
ALPHA-2-GLOBULIN: 0.7 GM/DL (ref 0.4–1.2)
BETA GLOBULIN: 0.9 GM/DL (ref 0.5–1.3)
GAMMA GLOBULIN: 1 GM/DL (ref 0.5–1.6)
SPEP INTERPRETATION: ABNORMAL
TOTAL PROTEIN: 7.2 GM/DL (ref 6.4–8.2)

## 2024-03-22 ENCOUNTER — HOSPITAL ENCOUNTER (OUTPATIENT)
Dept: ULTRASOUND IMAGING | Age: 59
Discharge: HOME | End: 2024-03-22
Attending: INTERNAL MEDICINE
Payer: COMMERCIAL

## 2024-03-22 DIAGNOSIS — D72.819 LEUKOPENIA, UNSPECIFIED TYPE: ICD-10-CM

## 2024-03-22 DIAGNOSIS — D75.89 MACROCYTOSIS WITHOUT ANEMIA: ICD-10-CM

## 2024-03-22 PROCEDURE — 76705 ECHO EXAM OF ABDOMEN: CPT

## 2024-04-08 ENCOUNTER — HOSPITAL ENCOUNTER (OUTPATIENT)
Dept: INFUSION THERAPY | Age: 59
Discharge: HOME | End: 2024-04-08
Payer: COMMERCIAL

## 2024-04-08 ENCOUNTER — OFFICE VISIT (OUTPATIENT)
Dept: ONCOLOGY | Age: 59
End: 2024-04-08
Payer: COMMERCIAL

## 2024-04-08 VITALS
DIASTOLIC BLOOD PRESSURE: 83 MMHG | OXYGEN SATURATION: 97 % | TEMPERATURE: 97.7 F | WEIGHT: 160.8 LBS | HEART RATE: 89 BPM | RESPIRATION RATE: 18 BRPM | BODY MASS INDEX: 25.84 KG/M2 | SYSTOLIC BLOOD PRESSURE: 162 MMHG | HEIGHT: 66 IN

## 2024-04-08 DIAGNOSIS — D72.819 LEUKOPENIA, UNSPECIFIED TYPE: ICD-10-CM

## 2024-04-08 DIAGNOSIS — D75.89 MACROCYTOSIS WITHOUT ANEMIA: Primary | ICD-10-CM

## 2024-04-08 PROCEDURE — 99211 OFF/OP EST MAY X REQ PHY/QHP: CPT

## 2024-04-08 PROCEDURE — 3077F SYST BP >= 140 MM HG: CPT | Performed by: INTERNAL MEDICINE

## 2024-04-08 PROCEDURE — 99213 OFFICE O/P EST LOW 20 MIN: CPT | Performed by: INTERNAL MEDICINE

## 2024-04-08 PROCEDURE — 3079F DIAST BP 80-89 MM HG: CPT | Performed by: INTERNAL MEDICINE

## 2024-04-08 NOTE — PROGRESS NOTES
MA Rooming Questions  Patient: Afsaneh Martines  MRN: [id number]    Date: 4/8/2024        1. Do you have any new issues?   no         2. Do you need any refills on medications?    no    3. Have you had any imaging done since your last visit?   yes - us    4. Have you been hospitalized or seen in the emergency room since your last visit here?   no    5. Did the patient have a depression screening completed today? No    No data recorded     PHQ-9 Given to (if applicable):               PHQ-9 Score (if applicable):                     [] Positive     []  Negative              Does question #9 need addressed (if applicable)                     [] Yes    []  No               Holly Carmen CMA

## 2024-04-10 ENCOUNTER — CLINICAL DOCUMENTATION (OUTPATIENT)
Dept: ONCOLOGY | Age: 59
End: 2024-04-10

## 2024-04-11 ENCOUNTER — CLINICAL DOCUMENTATION (OUTPATIENT)
Dept: RADIATION ONCOLOGY | Age: 59
End: 2024-04-11

## 2024-04-11 NOTE — PROGRESS NOTES
Pt referred to [facility] for supportive counseling services.

## 2024-05-23 LAB — COMMENT: NORMAL

## 2024-12-16 ENCOUNTER — TELEPHONE (OUTPATIENT)
Dept: BARIATRICS/WEIGHT MGMT | Age: 59
End: 2024-12-16

## 2024-12-16 DIAGNOSIS — Z98.84 STATUS POST BARIATRIC SURGERY: Primary | ICD-10-CM

## (undated) DEVICE — STAPLER 60 RELOAD GREEN: Brand: SUREFORM

## (undated) DEVICE — VESSEL SEALER EXTEND: Brand: ENDOWRIST

## (undated) DEVICE — 3M™ IOBAN™ 2 ANTIMICROBIAL INCISE DRAPE 6650EZ: Brand: IOBAN™ 2

## (undated) DEVICE — SUTURE VCRL SZ 4-0 L18IN ABSRB UD L19MM PS-2 3/8 CIR PRIM J496H

## (undated) DEVICE — SUREFORM 45: Brand: SUREFORM

## (undated) DEVICE — SUTURE SZ 0 27IN 5/8 CIR UR-6  TAPER PT VIOLET ABSRB VICRYL J603H

## (undated) DEVICE — SEALANT TISS 10 CC FIBRIN VISTASEAL

## (undated) DEVICE — ARM DRAPE

## (undated) DEVICE — GLOVE ORANGE PI 7   MSG9070

## (undated) DEVICE — SOLUTION IV IRRIG WATER 1000ML POUR BRL 2F7114

## (undated) DEVICE — FORCEPS BX L240CM JAW DIA2.8MM L CAP W/ NDL MIC MESH TOOTH

## (undated) DEVICE — STAPLER 60 RELOAD BLUE: Brand: SUREFORM

## (undated) DEVICE — SUTURE ETHBND EXCEL SZ 0 L36IN NONABSORBABLE GRN SH L26MM X524H

## (undated) DEVICE — SEAL

## (undated) DEVICE — Z DISCONTINUED (USE MFG CAT MVABO)  TUBING GAS SAMPLING STD 6.5 FT FEMALE CONN SMRT CAPNOLINE

## (undated) DEVICE — SET TBNG DISP TIP FOR AHTO

## (undated) DEVICE — SUTURE VCRL SZ 3-0 L27IN ABSRB VLT L26MM SH 1/2 CIR J316H

## (undated) DEVICE — DUAL LUMEN STOMACH TUBE: Brand: SALEM SUMP

## (undated) DEVICE — TROCAR: Brand: KII FIOS FIRST ENTRY

## (undated) DEVICE — ADHESIVE SKIN CLSR 0.7ML TOP DERMBND ADV

## (undated) DEVICE — CANNULA SEAL

## (undated) DEVICE — BLADELESS OBTURATOR: Brand: WECK VISTA

## (undated) DEVICE — PROTECTOR EYE PT SELF ADH NS OPT GRD LF

## (undated) DEVICE — REDUCER: Brand: ENDOWRIST

## (undated) DEVICE — Device

## (undated) DEVICE — SOLUTION IV 1000ML 0.9% SOD CHL FOR IRRIG PLAS CONT

## (undated) DEVICE — COLUMN DRAPE

## (undated) DEVICE — SUTURE ETHBND EXCEL SZ 2-0 L36IN NONABSORBABLE GRN L26MM SH X523H

## (undated) DEVICE — STAPLER 60: Brand: SUREFORM

## (undated) DEVICE — Z DUPLICATE USE 2517136 APPLICATOR LAP 45 CM 2 FLX VISTASEAL

## (undated) DEVICE — ELECTRODE ES AD CRDLSS PT RET REM POLYHESIVE

## (undated) DEVICE — EXCEL 10FT (3.05 M) INSUFFLATION TUBING SET WITH 0.1 MICRON FILTER: Brand: EXCEL

## (undated) DEVICE — DRAPE SHEET ULTRAGARD: Brand: MEDLINE